# Patient Record
Sex: FEMALE | Race: WHITE | NOT HISPANIC OR LATINO | Employment: FULL TIME | URBAN - METROPOLITAN AREA
[De-identification: names, ages, dates, MRNs, and addresses within clinical notes are randomized per-mention and may not be internally consistent; named-entity substitution may affect disease eponyms.]

---

## 2017-08-28 LAB — CRC RECOMMENDATION EXT: NORMAL

## 2018-04-30 ENCOUNTER — TELEPHONE (OUTPATIENT)
Dept: SURGERY | Facility: CLINIC | Age: 53
End: 2018-04-30

## 2018-04-30 NOTE — TELEPHONE ENCOUNTER
Called patient regarding referral we received from Nuvia Escalante NP at [facility]. Left voicemail with my callback number requesting return call at earliest convenience

## 2018-04-30 NOTE — TELEPHONE ENCOUNTER
Patient returned my call, we discussed referral from Nuvia Escalante NP. Explained our abnormal mammogram protocol to patient, she would like her images reviewed. She requests that I e-mail her a JOSUE and she will sign it and get it back to me. Emailed JOSUE to [email or web address] and will request images as soon as signed copy is received. Encouraged her to call if she needs anything or wants to check on the status. Verbalized understanding of all information

## 2018-05-01 ENCOUNTER — TELEPHONE (OUTPATIENT)
Dept: SURGERY | Facility: CLINIC | Age: 53
End: 2018-05-01

## 2018-05-01 NOTE — TELEPHONE ENCOUNTER
Called patient to let her know we received her images from [facility]. Reviewed timeline for review, patient verbalized understanding

## 2018-05-03 ENCOUNTER — HOSPITAL ENCOUNTER (OUTPATIENT)
Dept: RADIOLOGY | Facility: HOSPITAL | Age: 53
Discharge: HOME | End: 2018-05-03
Attending: NURSE PRACTITIONER

## 2018-05-04 ENCOUNTER — TELEPHONE (OUTPATIENT)
Dept: RADIOLOGY | Facility: HOSPITAL | Age: 53
End: 2018-05-04

## 2018-05-04 NOTE — TELEPHONE ENCOUNTER
Spoke with patient reviewed MRI results. Reviewed MRI breast biopsy procedure and reviewed instructions for MRI breast biopsy. Patient expressed understanding and all questions were answered. Provided patient with my phone number to call for any further concerns or questions.   Patient scheduled consult with Dr Meraz 5/16/18 and MRI breast biopsy 5/17/18.

## 2018-05-16 ENCOUNTER — OFFICE VISIT (OUTPATIENT)
Dept: SURGERY | Facility: CLINIC | Age: 53
End: 2018-05-16
Payer: COMMERCIAL

## 2018-05-16 VITALS — HEART RATE: 77 BPM | SYSTOLIC BLOOD PRESSURE: 125 MMHG | TEMPERATURE: 98 F | DIASTOLIC BLOOD PRESSURE: 87 MMHG

## 2018-05-16 DIAGNOSIS — N64.53 NIPPLE RETRACTION: Primary | ICD-10-CM

## 2018-05-16 PROCEDURE — 99999 PR PBB SHADOW E&M-EST. PATIENT-LVL III: CPT | Mod: PBBFAC,,, | Performed by: SURGERY

## 2018-05-16 PROCEDURE — 99204 OFFICE O/P NEW MOD 45 MIN: CPT | Mod: S$GLB,,, | Performed by: SURGERY

## 2018-05-16 RX ORDER — ATENOLOL AND CHLORTHALIDONE TABLET 50; 25 MG/1; MG/1
1 TABLET ORAL EVERY MORNING
Refills: 3 | COMMUNITY
Start: 2018-03-08 | End: 2022-06-23 | Stop reason: SDUPTHER

## 2018-05-16 NOTE — LETTER
[facility]  [address]  [facility] [address]  Phone: [phone]  Fax: [phone] May 28, 2018      Nuvia Escalante, LESLIE-C  [address] [facility] [facility] [address]    Patient: Marquita Roe   MR Number: [id number]   YOB: 1965   Date of Visit: 5/16/2018     Dear Ms. Escalante:    Thank you for referring Marquita Roe to me for evaluation. Below are the relevant portions of my assessment and plan of care.    Ms. Roe has a right breast abnormality on MRI, inversion of nipple.    She will  undergo MRI-guided biopsy tomorrow.  We discussed expectations for the procedure and the potential results we may be faced with.  We discussed that regardless of what the needle biopsy shows, we would consider a surgical excision due to her abnormal exam findings not seeming benign in nature. She understands and is in full agreement with this plan.  I will see her back after the biopsy results regardless of result.    45 minutes were spent on this encounter, 30 of which were face to face counseling and 15 minutes of chart review and coordination of care.    If you have questions, please do not hesitate to call me. I look forward to following Marquita along with you.    Sincerely,      Chayo Meraz MD  Section of Breast Surgery  [facility]  Department of Surgery  [facility]    AR/hcr

## 2018-05-16 NOTE — PROGRESS NOTES
Breast Surgery  [facility]  Department of Surgery      REFERRING PROVIDER: Nuvia Escalante, NP-C  [address] [facility] [place] [address]    Chief Complaint: Consult (CBE prior to MRI bx 18)      Subjective:      Patient ID: Marquita Roe is a 52 y.o. female who presents with palpable right breast mass associated with nipple discharge and nipple inversion. She states that the inversion and nipple discharge was first noticed in march.  Occurred once- serous in nature, non bloody, non purulent.     Mammogram and U/S on 18 did not reveal any abnormality. Follow-up MRI 18 showed near non mass enhancement in the central right breast extending towards the nipple with possible nipple retraction, measuring over 4 cm in long axis.  A breast biopsy has not yet been performed. Has had annual bilateral mammograms ([id number]) all BIRADS1-2.     Patient does routinely do self breast exams. . Patient denies a personal history of breast cancer.    GYN History:  Age of menarche was 15. Age of menopause was perimenopausal.  Last menstrual period was 8 years ago. Patient denies hormonal therapy. On Lybrel for 5 years, stopped 4 years ago. Patient is . Age of first live birth was 25. Patient did breast feed.    PMH: HTN, HLD, Hemorrhoids   PSH: Nasal surgery, ACL repair       Smoker: Never smoker, Social drinker.     Family history  Mother - lung cancer, diagnosed 69,  (smoker)  No breast cancer, ovarian cancer, pancreatic cancer.     Review of patient's allergies indicates:  No Known Allergies      No current outpatient prescriptions on file prior to visit.     No current facility-administered medications on file prior to visit.      Social History     Social History    Marital status:      Spouse name: N/A    Number of children: N/A    Years of education: N/A     Occupational History    Not on file.     Social History Main Topics    Smoking status: Never  Smoker    Smokeless tobacco: Never Used    Alcohol use Not on file    Drug use: Unknown    Sexual activity: Not on file     Other Topics Concern    Not on file     Social History Narrative    No narrative on file     History reviewed. No pertinent family history.     Review of Systems   Constitutional: Negative for appetite change, chills, fever and unexpected weight change.   HENT: Negative for facial swelling, postnasal drip and sore throat.    Eyes: Negative for redness and itching.   Respiratory: Negative for chest tightness and shortness of breath.    Cardiovascular: Negative for chest pain and palpitations.   Gastrointestinal: Negative for blood in stool, diarrhea, nausea and vomiting.   Genitourinary: Negative for difficulty urinating and dysuria.   Musculoskeletal: Negative for arthralgias and joint swelling.   Skin: Negative for rash and wound.   Neurological: Negative for dizziness and syncope.   Hematological: Negative for adenopathy.   Psychiatric/Behavioral: Negative for agitation. The patient is not nervous/anxious.       + lower Back pain      Objective:   /87 (BP Location: Left arm, Patient Position: Sitting, BP Method: Medium (Automatic))   Pulse 77   Temp 98.3 °F (36.8 °C) (Oral)     Physical Exam   Constitutional: She appears well-developed and well-nourished.   HENT:   Head: Normocephalic.   Eyes: No scleral icterus.   Neck: Neck supple. No tracheal deviation present.   Cardiovascular: Normal rate and regular rhythm.    Pulmonary/Chest: Breath sounds normal. No respiratory distress. Right breast exhibits inverted nipple. Right breast exhibits no mass, no nipple discharge, no skin change and no tenderness. Left breast exhibits no inverted nipple, no mass, no nipple discharge, no skin change and no tenderness.   Right nipple inversion. Unable to express any nipple discharge. There is fullness to the breast.   Abdominal: Soft. She exhibits no mass. There is no tenderness.    Musculoskeletal: She exhibits no edema.   Lymphadenopathy:     She has no cervical adenopathy.   Neurological: She is alert.   Skin: No rash noted. No erythema.     Psychiatric: She has a normal mood and affect.          Radiology review: Images personally reviewed by me in the clinic.  OUTSIDE FILM REVIEW   Clinical information: Breast lump and clear nipple discharge. Outside images have been performed including breast MRI.   Mammogram (03/13/2018) there are scattered fibroglandular densities. Scattered benign-appearing calcifications are present bilaterally. There is no evidence of suspicious mass, architectural distortion, or clustered microcalcifications in either breast.  Breast ultrasound (03/13/2018) please note that due to the  depended nature of ultrasound, evaluation of outside images is limited. Images of the right breast including the retroareolar region and axilla were provided for review. No definite sonographic abnormalities are detected on the images provided.     Breast MRI (04/20/2018) Evaluation is limited by lack of subtracted images and kinetic evaluation. There is a suggestion of linear non mass enhancement in the central right breast extending towards the nipple with possible nipple retraction, measuring over 4 cm in long axis.   Based on available images, there appears to be suspicious non mass enhancement in the central right breast extending towards the nipple seen on MRI only without mammographic or sonographic correlate. MRI guided biopsy is recommended.   BI-RADS ASSESSMENT CATEGORY: 4 SUSPICIOUS   Assessment:       Right breast abnormality on MRI, inversion of nipple   Plan:       Will  Undergo MRI-guided biopsy tomorrow.  We discussed expectations for the procedure and the potential results we may be faced with.  We discussed that regardless of what the needle biopsy shows, we would consider a surgical excision due to her abnormal exam findings not seeming benign in nature.     She understands and is in full agreement with this plan.  I will see her back after the biopsy results regardless of result.      45 minutes were spent on this encounter, 30 of which were face to face counseling and 15 minutes of chart review and coordination of care.

## 2018-05-16 NOTE — LETTER
May 27, 2018      Nuvia Escalante, NP-C  [address]  [facility] [place] [address]           [facility]  [address]  [facility] [address]  Phone: [phone]  Fax: [phone]          Patient: Marquita Roe   MR Number: [id number]   YOB: 1965   Date of Visit: 5/16/2018       Dear Nuvia Escalante:    Thank you for referring Marquita Roe to me for evaluation. Attached you will find relevant portions of my assessment and plan of care.    If you have questions, please do not hesitate to call me. I look forward to following Marquita Roe along with you.    Sincerely,    Chayo Meraz MD    Enclosure  CC:  No Recipients    If you would like to receive this communication electronically, please contact [email or web address] or [phone] to request more information on [employer] access.    For providers and/or their staff who would like to refer a patient to [facility], please contact us through our one-stop-shop provider referral line, [facility], at [phone].    If you feel you have received this communication in error or would no longer like to receive these types of communications, please e-mail [email or web address]

## 2018-05-17 ENCOUNTER — HOSPITAL ENCOUNTER (OUTPATIENT)
Dept: RADIOLOGY | Facility: HOSPITAL | Age: 53
Discharge: HOME | End: 2018-05-17
Attending: SURGERY
Payer: COMMERCIAL

## 2018-05-17 DIAGNOSIS — R93.89 ABNORMAL MRI: Primary | ICD-10-CM

## 2018-05-17 DIAGNOSIS — R93.89 ABNORMAL MRI: ICD-10-CM

## 2018-05-17 PROCEDURE — 88342 IMHCHEM/IMCYTCHM 1ST ANTB: CPT | Performed by: PATHOLOGY

## 2018-05-17 PROCEDURE — 77065 DX MAMMO INCL CAD UNI: CPT | Mod: TC,RT

## 2018-05-17 PROCEDURE — 88305 TISSUE EXAM BY PATHOLOGIST: CPT | Mod: 26,,, | Performed by: PATHOLOGY

## 2018-05-17 PROCEDURE — 88342 IMHCHEM/IMCYTCHM 1ST ANTB: CPT | Mod: 26,,, | Performed by: PATHOLOGY

## 2018-05-17 PROCEDURE — 19085 BX BREAST 1ST LESION MR IMAG: CPT | Mod: TC

## 2018-05-17 PROCEDURE — 77065 DX MAMMO INCL CAD UNI: CPT | Mod: 26,RT,, | Performed by: RADIOLOGY

## 2018-05-17 PROCEDURE — 25500020 PHARM REV CODE 255: Performed by: SURGERY

## 2018-05-17 PROCEDURE — A9577 INJ MULTIHANCE: HCPCS | Performed by: SURGERY

## 2018-05-17 PROCEDURE — 19085 BX BREAST 1ST LESION MR IMAG: CPT | Mod: RT,,, | Performed by: RADIOLOGY

## 2018-05-17 PROCEDURE — 88305 TISSUE EXAM BY PATHOLOGIST: CPT | Performed by: PATHOLOGY

## 2018-05-17 RX ADMIN — GADOBENATE DIMEGLUMINE 15 ML: 529 INJECTION, SOLUTION INTRAVENOUS at 08:05

## 2018-05-23 ENCOUNTER — PATIENT MESSAGE (OUTPATIENT)
Dept: SURGERY | Facility: CLINIC | Age: 53
End: 2018-05-23

## 2018-05-24 ENCOUNTER — TELEPHONE (OUTPATIENT)
Dept: SURGERY | Facility: CLINIC | Age: 53
End: 2018-05-24

## 2018-05-24 NOTE — TELEPHONE ENCOUNTER
----- Message from Chayo Meraz MD sent at 5/24/2018 11:49 AM CDT -----  Not sure if you have called her with path yet, but it is discordant.  I am pretty sure I already talked to her in the office about needing to take a surgical excisional biopsy if the needle biopsy came back benign, so if you could just set her up to return to clinic for more discussion on this when you call, that would be great.  Again, I don't think this plan will be unexpected to her.    Thanks  Chayo

## 2018-05-24 NOTE — TELEPHONE ENCOUNTER
Called patient to discuss biopsy results. Explained that biopsy showed chronic inflammation and fibrosis, however both Dr. Griffin and Dr. Meraz believe this should still be excised due to the affect it has had on her breast. Patient agrees with this, she and Dr. Meraz had already discussed it. Scheduled her to come back in and talk to Dr. Meraz on 6/5 per her request, so they can tentatively schedule the procedure while she is on summer vacation. Patient verbalized understanding to appt information and plan of care, encouraged her to call with any questions/concerns.

## 2018-06-05 ENCOUNTER — OFFICE VISIT (OUTPATIENT)
Dept: SURGERY | Facility: CLINIC | Age: 53
End: 2018-06-05
Payer: COMMERCIAL

## 2018-06-05 VITALS — SYSTOLIC BLOOD PRESSURE: 129 MMHG | HEART RATE: 71 BPM | DIASTOLIC BLOOD PRESSURE: 86 MMHG

## 2018-06-05 DIAGNOSIS — N64.53 NIPPLE RETRACTION: Primary | ICD-10-CM

## 2018-06-05 PROCEDURE — 99999 PR PBB SHADOW E&M-EST. PATIENT-LVL II: CPT | Mod: PBBFAC,,, | Performed by: SURGERY

## 2018-06-05 PROCEDURE — 99215 OFFICE O/P EST HI 40 MIN: CPT | Mod: S$GLB,,, | Performed by: SURGERY

## 2018-06-05 RX ORDER — NAPROXEN SODIUM 220 MG/1
81 TABLET, FILM COATED ORAL EVERY MORNING
COMMUNITY
End: 2022-02-02 | Stop reason: SDUPTHER

## 2018-06-05 NOTE — PROGRESS NOTES
Breast Surgery  [facility]  Department of Surgery      REFERRING PROVIDER: No referring provider defined for this encounter.    Chief Complaint: Nipple inversion, abnormal MRI    Subjective:      Patient ID: Marquita Roe is a 52 y.o. female who presents with right breast mass associated with nipple discharge and nipple inversion. She states that the inversion and nipple discharge was first noticed in march.  Occurred once- serous in nature, non bloody, non purulent.     Mammogram and U/S on 18 did not reveal any abnormality. Follow-up MRI 18 showed near non mass enhancement in the central right breast extending towards the nipple with possible nipple retraction, measuring over 4 cm in long axis.  A breast biopsy has was negative which is discordant Has had annual bilateral mammograms ([id number]) all BIRADS1-2.     Patient does routinely do self breast exams. . Patient denies a personal history of breast cancer.    She is here for f/u after non-diagnostic results.  No changes in hx    GYN History:  Age of menarche was 15. Age of menopause was perimenopausal.  Last menstrual period was 8 years ago. Patient denies hormonal therapy. On Lybrel for 5 years, stopped 4 years ago. Patient is . Age of first live birth was 25. Patient did breast feed.    PMH: HTN, HLD, Hemorrhoids   PSH: Nasal surgery, ACL repair       Smoker: Never smoker, Social drinker.     Family history  Mother - lung cancer, diagnosed 69,  (smoker)  No breast cancer, ovarian cancer, pancreatic cancer.     Review of patient's allergies indicates:  No Known Allergies      Current Outpatient Prescriptions on File Prior to Visit   Medication Sig Dispense Refill    atenolol-chlorthalidone (TENORETIC) 50-25 mg Tab Take 1 tablet by mouth every morning.  3     No current facility-administered medications on file prior to visit.      Social History     Social History    Marital status:      Spouse name: N/A    Number  of children: N/A    Years of education: N/A     Occupational History    Not on file.     Social History Main Topics    Smoking status: Never Smoker    Smokeless tobacco: Never Used    Alcohol use Not on file    Drug use: Unknown    Sexual activity: Not on file     Other Topics Concern    Not on file     Social History Narrative    No narrative on file     Family History   Problem Relation Age of Onset    Cancer Mother 69        Lung - Smoker        Review of Systems   Constitutional: Negative for appetite change, chills, fever and unexpected weight change.   HENT: Negative for facial swelling, postnasal drip and sore throat.    Eyes: Negative for redness and itching.   Respiratory: Negative for chest tightness and shortness of breath.    Cardiovascular: Negative for chest pain and palpitations.   Gastrointestinal: Negative for blood in stool, diarrhea, nausea and vomiting.   Genitourinary: Negative for difficulty urinating and dysuria.   Musculoskeletal: Negative for arthralgias and joint swelling.   Skin: Negative for rash and wound.   Neurological: Negative for dizziness and syncope.   Hematological: Negative for adenopathy.   Psychiatric/Behavioral: Negative for agitation. The patient is not nervous/anxious.       + lower Back pain      Objective:   /86 (BP Location: Left arm, Patient Position: Sitting, BP Method: Medium (Automatic))   Pulse 71     Physical Exam   Constitutional: She appears well-developed and well-nourished.   HENT:   Head: Normocephalic.   Eyes: No scleral icterus.   Neck: Neck supple. No tracheal deviation present.   Cardiovascular: Normal rate and regular rhythm.    Pulmonary/Chest: Breath sounds normal. No respiratory distress. Right breast exhibits inverted nipple. Right breast exhibits no mass, no nipple discharge, no skin change and no tenderness. Left breast exhibits no inverted nipple, no mass, no nipple discharge, no skin change and no tenderness.   Right nipple  inversion. Unable to express any nipple discharge. There is fullness to the breast.   Abdominal: Soft. She exhibits no mass. There is no tenderness.   Musculoskeletal: She exhibits no edema.   Lymphadenopathy:     She has no cervical adenopathy.   Neurological: She is alert.   Skin: No rash noted. No erythema.     Psychiatric: She has a normal mood and affect.          Radiology review: Images personally reviewed by me in the clinic.  OUTSIDE FILM REVIEW   Clinical information: Breast lump and clear nipple discharge. Outside images have been performed including breast MRI.   Mammogram (03/13/2018) there are scattered fibroglandular densities. Scattered benign-appearing calcifications are present bilaterally. There is no evidence of suspicious mass, architectural distortion, or clustered microcalcifications in either breast.  Breast ultrasound (03/13/2018) please note that due to the  depended nature of ultrasound, evaluation of outside images is limited. Images of the right breast including the retroareolar region and axilla were provided for review. No definite sonographic abnormalities are detected on the images provided.     Breast MRI (04/20/2018) Evaluation is limited by lack of subtracted images and kinetic evaluation. There is a suggestion of linear non mass enhancement in the central right breast extending towards the nipple with possible nipple retraction, measuring over 4 cm in long axis.   Based on available images, there appears to be suspicious non mass enhancement in the central right breast extending towards the nipple seen on MRI only without mammographic or sonographic correlate. MRI guided biopsy is recommended.   BI-RADS ASSESSMENT CATEGORY: 4 SUSPICIOUS       PATH:  FINAL PATHOLOGIC DIAGNOSIS  RIGHT BREAST, NON-MASS ENHANCEMENT, BIOPSY:  - Benign breast tissue with duct ectasia surrounded by chronic inflammation and fibrosis.  - Negative for atypia or malignancy.  Note: Pancytokeratin  with appropriate controls is used for evaluation of multiple foci and supports the diagnosis that these foci are indeed inflammation and not epithelial cells.      Assessment:       Right breast abnormality on MRI, inversion of nipple, negative breast biopsy.  US in the office today did not reveal any abnormality, nor could the clip be visualized  Plan:       Given negative clinic US, pt will need MMG guided mag seed placement at the clip so that an excisional biopsy can be performed  MRI imaging results are not concordant with benign biopsy results  Currently pt is dealing with a Stroke in the family in her sister with a poor prognosis and has been spending a lot of time in the hospital  Will schedule MG guided seed placement at her earliest convenience with plan to proceed to Mag seed localized excisional biopsy  Consents obtained in the office today  We discussed that this entire area may not be widely excised however, we will provide the pathologist with enough tissue to be more certain that there is no malignancy in the area.  Will attempt duct excision, but I have not been able to elicit nipple discharge in clinic so not certain that we will be able to do so.    60 minutes were spent on this encounter, 45 of which were face to face and included counseling, 15 on coordination of care and chart review

## 2018-06-06 ENCOUNTER — TELEPHONE (OUTPATIENT)
Dept: SURGERY | Facility: CLINIC | Age: 53
End: 2018-06-06

## 2018-06-06 NOTE — TELEPHONE ENCOUNTER
Spoke with pt regarding the magseed localization time on Wednesday 6/13/18. Pt scheduled at 9:30 am and to report to the [facility] [place]. Pt voiced understanding and will call us with an exact surgery date in the next week or so, as pt caring for her sister who is in the hospital.

## 2018-06-11 DIAGNOSIS — R92.8 ABNORMAL MAMMOGRAM OF RIGHT BREAST: Primary | ICD-10-CM

## 2018-06-13 ENCOUNTER — HOSPITAL ENCOUNTER (OUTPATIENT)
Dept: RADIOLOGY | Facility: HOSPITAL | Age: 53
Discharge: HOME | End: 2018-06-13
Attending: SURGERY
Payer: COMMERCIAL

## 2018-06-13 DIAGNOSIS — N63.0 BREAST MASS: ICD-10-CM

## 2018-06-13 DIAGNOSIS — N64.52 NIPPLE DISCHARGE: ICD-10-CM

## 2018-06-13 PROCEDURE — A4648 IMPLANTABLE TISSUE MARKER: HCPCS | Mod: PO

## 2018-06-13 PROCEDURE — 19281 PERQ DEVICE BREAST 1ST IMAG: CPT | Mod: RT,,, | Performed by: RADIOLOGY

## 2018-06-19 ENCOUNTER — ANESTHESIA EVENT (OUTPATIENT)
Dept: SURGERY | Facility: HOSPITAL | Age: 53
End: 2018-06-19
Payer: COMMERCIAL

## 2018-06-20 ENCOUNTER — TELEPHONE (OUTPATIENT)
Dept: SURGERY | Facility: CLINIC | Age: 53
End: 2018-06-20

## 2018-06-21 ENCOUNTER — ANESTHESIA (OUTPATIENT)
Dept: SURGERY | Facility: HOSPITAL | Age: 53
End: 2018-06-21
Payer: COMMERCIAL

## 2018-06-21 ENCOUNTER — HOSPITAL ENCOUNTER (OUTPATIENT)
Dept: RADIOLOGY | Facility: HOSPITAL | Age: 53
Discharge: HOME | End: 2018-06-21
Attending: SURGERY | Admitting: SURGERY
Payer: COMMERCIAL

## 2018-06-21 ENCOUNTER — HOSPITAL ENCOUNTER (OUTPATIENT)
Facility: HOSPITAL | Age: 53
Discharge: HOME | End: 2018-06-21
Attending: SURGERY | Admitting: SURGERY
Payer: COMMERCIAL

## 2018-06-21 VITALS
RESPIRATION RATE: 18 BRPM | HEIGHT: 66 IN | OXYGEN SATURATION: 99 % | BODY MASS INDEX: 24.11 KG/M2 | DIASTOLIC BLOOD PRESSURE: 86 MMHG | TEMPERATURE: 98 F | HEART RATE: 84 BPM | WEIGHT: 150 LBS | SYSTOLIC BLOOD PRESSURE: 122 MMHG

## 2018-06-21 DIAGNOSIS — N63.10 BREAST MASS, RIGHT: Primary | ICD-10-CM

## 2018-06-21 DIAGNOSIS — N63.0 BREAST MASS: ICD-10-CM

## 2018-06-21 DIAGNOSIS — N64.52 NIPPLE DISCHARGE: ICD-10-CM

## 2018-06-21 PROCEDURE — 63600175 PHARM REV CODE 636 W HCPCS: Mod: JG | Performed by: SURGERY

## 2018-06-21 PROCEDURE — 36000707: Performed by: SURGERY

## 2018-06-21 PROCEDURE — 71000016 HC POSTOP RECOV ADDL HR: Performed by: SURGERY

## 2018-06-21 PROCEDURE — 88307 TISSUE EXAM BY PATHOLOGIST: CPT | Performed by: PATHOLOGY

## 2018-06-21 PROCEDURE — 76098 X-RAY EXAM SURGICAL SPECIMEN: CPT | Mod: TC,PO

## 2018-06-21 PROCEDURE — 63600175 PHARM REV CODE 636 W HCPCS: Performed by: NURSE ANESTHETIST, CERTIFIED REGISTERED

## 2018-06-21 PROCEDURE — 36000706: Performed by: SURGERY

## 2018-06-21 PROCEDURE — 63600175 PHARM REV CODE 636 W HCPCS: Performed by: STUDENT IN AN ORGANIZED HEALTH CARE EDUCATION/TRAINING PROGRAM

## 2018-06-21 PROCEDURE — S0020 INJECTION, BUPIVICAINE HYDRO: HCPCS | Performed by: SURGERY

## 2018-06-21 PROCEDURE — 88307 TISSUE EXAM BY PATHOLOGIST: CPT | Mod: 26,,, | Performed by: PATHOLOGY

## 2018-06-21 PROCEDURE — 37000008 HC ANESTHESIA 1ST 15 MINUTES: Performed by: SURGERY

## 2018-06-21 PROCEDURE — 25000003 PHARM REV CODE 250: Performed by: STUDENT IN AN ORGANIZED HEALTH CARE EDUCATION/TRAINING PROGRAM

## 2018-06-21 PROCEDURE — 25000003 PHARM REV CODE 250: Performed by: SURGERY

## 2018-06-21 PROCEDURE — D9220A PRA ANESTHESIA: Mod: ,,, | Performed by: ANESTHESIOLOGY

## 2018-06-21 PROCEDURE — 71000015 HC POSTOP RECOV 1ST HR: Performed by: SURGERY

## 2018-06-21 PROCEDURE — 37000009 HC ANESTHESIA EA ADD 15 MINS: Performed by: SURGERY

## 2018-06-21 PROCEDURE — 76098 X-RAY EXAM SURGICAL SPECIMEN: CPT | Mod: 26,,, | Performed by: RADIOLOGY

## 2018-06-21 PROCEDURE — 19125 EXCISION BREAST LESION: CPT | Mod: RT,,, | Performed by: SURGERY

## 2018-06-21 PROCEDURE — 25000003 PHARM REV CODE 250: Performed by: NURSE ANESTHETIST, CERTIFIED REGISTERED

## 2018-06-21 PROCEDURE — 88305 TISSUE EXAM BY PATHOLOGIST: CPT | Performed by: PATHOLOGY

## 2018-06-21 PROCEDURE — 88305 TISSUE EXAM BY PATHOLOGIST: CPT | Mod: 26,,, | Performed by: PATHOLOGY

## 2018-06-21 PROCEDURE — 71000044 HC DOSC ROUTINE RECOVERY FIRST HOUR: Performed by: SURGERY

## 2018-06-21 RX ORDER — BUPIVACAINE HYDROCHLORIDE 5 MG/ML
INJECTION, SOLUTION EPIDURAL; INTRACAUDAL
Status: DISCONTINUED | OUTPATIENT
Start: 2018-06-21 | End: 2018-06-21 | Stop reason: HOSPADM

## 2018-06-21 RX ORDER — ONDANSETRON 2 MG/ML
INJECTION INTRAMUSCULAR; INTRAVENOUS
Status: DISCONTINUED | OUTPATIENT
Start: 2018-06-21 | End: 2018-06-21

## 2018-06-21 RX ORDER — PROPOFOL 10 MG/ML
VIAL (ML) INTRAVENOUS
Status: DISCONTINUED | OUTPATIENT
Start: 2018-06-21 | End: 2018-06-21

## 2018-06-21 RX ORDER — FENTANYL CITRATE 50 UG/ML
25 INJECTION, SOLUTION INTRAMUSCULAR; INTRAVENOUS EVERY 5 MIN PRN
Status: DISCONTINUED | OUTPATIENT
Start: 2018-06-21 | End: 2018-06-21 | Stop reason: HOSPADM

## 2018-06-21 RX ORDER — SODIUM CHLORIDE 0.9 % (FLUSH) 0.9 %
3 SYRINGE (ML) INJECTION
Status: DISCONTINUED | OUTPATIENT
Start: 2018-06-21 | End: 2018-06-21 | Stop reason: HOSPADM

## 2018-06-21 RX ORDER — MIDAZOLAM HYDROCHLORIDE 1 MG/ML
INJECTION, SOLUTION INTRAMUSCULAR; INTRAVENOUS
Status: DISCONTINUED | OUTPATIENT
Start: 2018-06-21 | End: 2018-06-21

## 2018-06-21 RX ORDER — GLYCOPYRROLATE 0.2 MG/ML
INJECTION INTRAMUSCULAR; INTRAVENOUS
Status: DISCONTINUED | OUTPATIENT
Start: 2018-06-21 | End: 2018-06-21

## 2018-06-21 RX ORDER — LIDOCAINE HCL/PF 100 MG/5ML
SYRINGE (ML) INTRAVENOUS
Status: DISCONTINUED | OUTPATIENT
Start: 2018-06-21 | End: 2018-06-21

## 2018-06-21 RX ORDER — FENTANYL CITRATE 50 UG/ML
INJECTION, SOLUTION INTRAMUSCULAR; INTRAVENOUS
Status: DISCONTINUED | OUTPATIENT
Start: 2018-06-21 | End: 2018-06-21

## 2018-06-21 RX ORDER — METHYLENE BLUE 5 MG/ML
INJECTION INTRAVENOUS
Status: DISCONTINUED | OUTPATIENT
Start: 2018-06-21 | End: 2018-06-21 | Stop reason: HOSPADM

## 2018-06-21 RX ORDER — HYDROCODONE BITARTRATE AND ACETAMINOPHEN 5; 325 MG/1; MG/1
1 TABLET ORAL EVERY 4 HOURS PRN
Qty: 31 TABLET | Refills: 0 | Status: SHIPPED | OUTPATIENT
Start: 2018-06-21 | End: 2018-07-11

## 2018-06-21 RX ORDER — DEXAMETHASONE SODIUM PHOSPHATE 4 MG/ML
INJECTION, SOLUTION INTRA-ARTICULAR; INTRALESIONAL; INTRAMUSCULAR; INTRAVENOUS; SOFT TISSUE
Status: DISCONTINUED | OUTPATIENT
Start: 2018-06-21 | End: 2018-06-21

## 2018-06-21 RX ORDER — KETAMINE HCL IN 0.9 % NACL 50 MG/5 ML
SYRINGE (ML) INTRAVENOUS
Status: DISCONTINUED | OUTPATIENT
Start: 2018-06-21 | End: 2018-06-21

## 2018-06-21 RX ORDER — HYDROCODONE BITARTRATE AND ACETAMINOPHEN 5; 325 MG/1; MG/1
1 TABLET ORAL ONCE AS NEEDED
Status: DISCONTINUED | OUTPATIENT
Start: 2018-06-21 | End: 2018-06-21 | Stop reason: HOSPADM

## 2018-06-21 RX ORDER — SODIUM CHLORIDE 9 MG/ML
INJECTION, SOLUTION INTRAVENOUS CONTINUOUS
Status: DISCONTINUED | OUTPATIENT
Start: 2018-06-21 | End: 2018-06-21 | Stop reason: HOSPADM

## 2018-06-21 RX ORDER — ACETAMINOPHEN 10 MG/ML
INJECTION, SOLUTION INTRAVENOUS
Status: DISCONTINUED | OUTPATIENT
Start: 2018-06-21 | End: 2018-06-21

## 2018-06-21 RX ORDER — PHENYLEPHRINE HYDROCHLORIDE 10 MG/ML
INJECTION INTRAVENOUS
Status: DISCONTINUED | OUTPATIENT
Start: 2018-06-21 | End: 2018-06-21

## 2018-06-21 RX ORDER — CEFAZOLIN SODIUM 1 G/3ML
2 INJECTION, POWDER, FOR SOLUTION INTRAMUSCULAR; INTRAVENOUS
Status: COMPLETED | OUTPATIENT
Start: 2018-06-21 | End: 2018-06-21

## 2018-06-21 RX ADMIN — FENTANYL CITRATE 25 MCG: 50 INJECTION, SOLUTION INTRAMUSCULAR; INTRAVENOUS at 05:06

## 2018-06-21 RX ADMIN — PHENYLEPHRINE HYDROCHLORIDE 100 MCG: 10 INJECTION INTRAVENOUS at 05:06

## 2018-06-21 RX ADMIN — SODIUM CHLORIDE, SODIUM GLUCONATE, SODIUM ACETATE, POTASSIUM CHLORIDE, MAGNESIUM CHLORIDE, SODIUM PHOSPHATE, DIBASIC, AND POTASSIUM PHOSPHATE: .53; .5; .37; .037; .03; .012; .00082 INJECTION, SOLUTION INTRAVENOUS at 05:06

## 2018-06-21 RX ADMIN — LIDOCAINE HYDROCHLORIDE 100 MG: 20 INJECTION, SOLUTION INTRAVENOUS at 04:06

## 2018-06-21 RX ADMIN — FENTANYL CITRATE 100 MCG: 50 INJECTION, SOLUTION INTRAMUSCULAR; INTRAVENOUS at 04:06

## 2018-06-21 RX ADMIN — ONDANSETRON 4 MG: 2 INJECTION INTRAMUSCULAR; INTRAVENOUS at 06:06

## 2018-06-21 RX ADMIN — DEXAMETHASONE SODIUM PHOSPHATE 8 MG: 4 INJECTION, SOLUTION INTRAMUSCULAR; INTRAVENOUS at 04:06

## 2018-06-21 RX ADMIN — SODIUM CHLORIDE: 0.9 INJECTION, SOLUTION INTRAVENOUS at 04:06

## 2018-06-21 RX ADMIN — ACETAMINOPHEN 1000 MG: 10 INJECTION, SOLUTION INTRAVENOUS at 04:06

## 2018-06-21 RX ADMIN — GLYCOPYRROLATE 0.2 MG: 0.2 INJECTION, SOLUTION INTRAMUSCULAR; INTRAVENOUS at 04:06

## 2018-06-21 RX ADMIN — FENTANYL CITRATE 25 MCG: 50 INJECTION, SOLUTION INTRAMUSCULAR; INTRAVENOUS at 06:06

## 2018-06-21 RX ADMIN — PROPOFOL 150 MG: 10 INJECTION, EMULSION INTRAVENOUS at 04:06

## 2018-06-21 RX ADMIN — Medication 20 MG: at 04:06

## 2018-06-21 RX ADMIN — MIDAZOLAM HYDROCHLORIDE 2 MG: 1 INJECTION, SOLUTION INTRAMUSCULAR; INTRAVENOUS at 04:06

## 2018-06-21 RX ADMIN — CEFAZOLIN 2 G: 330 INJECTION, POWDER, FOR SOLUTION INTRAMUSCULAR; INTRAVENOUS at 04:06

## 2018-06-21 RX ADMIN — Medication 10 MG: at 05:06

## 2018-06-21 NOTE — TRANSFER OF CARE
"Anesthesia Transfer of Care Note    Patient: Marquita Roe    Procedure(s) Performed: Procedure(s) (LRB):  EXCISIONAL BIOPSY-breast  w/SEED LOCALIZATION (Right)  EXCISION-DUCT Duct End Breast (Right)    Patient location: [facility]    Anesthesia Type: general    Transport from OR: Transported from OR on 6-10 L/min O2 by face mask with adequate spontaneous ventilation    Post pain: adequate analgesia    Post assessment: tolerated procedure well and no apparent anesthetic complications    Level of consciousness: sedated and responds to stimulation    Nausea/Vomiting: no nausea/vomiting    Complications: none    Transfer of care protocol was followed      Last vitals:   Visit Vitals  /81 (BP Location: Right arm, Patient Position: Lying)   Pulse 63   Temp 36.8 °C (98.3 °F) (Oral)   Resp 18   Ht 5' 6" (1.676 m)   Wt 68 kg (150 lb)   SpO2 97%   Breastfeeding? No   BMI 24.21 kg/m²     "

## 2018-06-21 NOTE — BRIEF OP NOTE
[facility]  Brief Operative Note     SUMMARY     Surgery Date: 6/21/2018     Surgeon(s) and Role:     * Chayo Meraz MD - Primary     * Tien Mcleod MD - Resident - Assisting      * Nanette Lopez MD - Resident - Assisting    Pre-op Diagnosis:  Abnormal mammogram of right breast [R92.8]    Post-op Diagnosis:  Post-Op Diagnosis Codes:     * Abnormal mammogram of right breast [R92.8]    Procedure(s) (LRB):  EXCISIONAL BIOPSY-breast  w/SEED LOCALIZATION (Right)  EXCISION-DUCT Duct End Breast (Right)    Anesthesia: General    Description of the findings of the procedure: Right breast excisional biopsy with right duct excision.    Findings/Key Components: Right breast excisional biopsy via ultrasound guidance. Verification of clip and mag seed within specimen via intraoperative x-ray. Right breast duct excision.    Estimated Blood Loss: 20mL         Specimens:   Specimen (12h ago through future)    Start     Ordered    06/21/18 1758  Specimen to Pathology - Surgery  Once     Comments:  1.) Right Breast Excisional Biopsy with Magseed; Short Stitch Superior, Long Stitch Lateral-Permanent.2.) Right Breast End Duct Excision; Short Stitch Superior, Long Stitch Lateral; 1 Short Stitch and 1 Long Stitch on the End Duct-Permanent.      06/21/18 1800          Discharge Note    SUMMARY     Admit Date: 6/21/2018    Discharge Date and Time:  06/21/2018 6:33 PM    Hospital Course (synopsis of major diagnoses, care, treatment, and services provided during the course of the hospital stay): Marquita Roe underwent outpatient right breast excisional biopsy. Prior to discharge home on 06/21/2018 her pain was well controlled on oral medications and she tolerated a diet.. She was discharged home in good condition on POD#0.       Final Diagnosis: Post-Op Diagnosis Codes:     * Abnormal mammogram of right breast [R92.8]    Disposition: Home or Self Care    Follow Up/Patient Instructions:     Medications:  Reconciled  Home Medications:      Medication List      START taking these medications    HYDROcodone-acetaminophen 5-325 mg per tablet  Commonly known as:  NORCO  Take 1 tablet by mouth every 4 (four) hours as needed for Pain (Do not drive while taking pain medications).        CONTINUE taking these medications    aspirin 81 MG Chew  Take 81 mg by mouth every morning.     atenolol-chlorthalidone 50-25 mg Tab  Commonly known as:  TENORETIC  Take 1 tablet by mouth every morning.            Discharge Procedure Orders  Diet Adult Regular     Activity as tolerated     Shower on day dressing removed (No bath)   Order Comments: Shower after 48 hours after surgery, do not submerge incisions for 6 weeks.     No driving until:   Order Comments: Do not drive while taking pain medications.     Notify your health care provider if you experience any of the following:  temperature >100.4     Notify your health care provider if you experience any of the following:  persistent nausea and vomiting or diarrhea     Notify your health care provider if you experience any of the following:  severe uncontrolled pain     Notify your health care provider if you experience any of the following:  redness, tenderness, or signs of infection (pain, swelling, redness, odor or green/yellow discharge around incision site)     No dressing needed   Order Comments: Incision covered with dermabond (superglue), no dressing needed. Can keep covered with gauze as needed to protect clothing.       Follow-up Information     Chayo Meraz MD In 2 weeks.    Specialties:  General Surgery, Breast Surgery  Why:  Post-op  Contact information:  [address]  [facility] [address]  [phone]

## 2018-06-21 NOTE — H&P (VIEW-ONLY)
Breast Surgery  [facility]  Department of Surgery      REFERRING PROVIDER: No referring provider defined for this encounter.    Chief Complaint: Nipple inversion, abnormal MRI    Subjective:      Patient ID: Marquita Roe is a 52 y.o. female who presents with right breast mass associated with nipple discharge and nipple inversion. She states that the inversion and nipple discharge was first noticed in march.  Occurred once- serous in nature, non bloody, non purulent.     Mammogram and U/S on 18 did not reveal any abnormality. Follow-up MRI 18 showed near non mass enhancement in the central right breast extending towards the nipple with possible nipple retraction, measuring over 4 cm in long axis.  A breast biopsy has was negative which is discordant Has had annual bilateral mammograms ([id number]) all BIRADS1-2.     Patient does routinely do self breast exams. . Patient denies a personal history of breast cancer.    She is here for f/u after non-diagnostic results.  No changes in hx    GYN History:  Age of menarche was 15. Age of menopause was perimenopausal.  Last menstrual period was 8 years ago. Patient denies hormonal therapy. On Lybrel for 5 years, stopped 4 years ago. Patient is . Age of first live birth was 25. Patient did breast feed.    PMH: HTN, HLD, Hemorrhoids   PSH: Nasal surgery, ACL repair       Smoker: Never smoker, Social drinker.     Family history  Mother - lung cancer, diagnosed 69,  (smoker)  No breast cancer, ovarian cancer, pancreatic cancer.     Review of patient's allergies indicates:  No Known Allergies      Current Outpatient Prescriptions on File Prior to Visit   Medication Sig Dispense Refill    atenolol-chlorthalidone (TENORETIC) 50-25 mg Tab Take 1 tablet by mouth every morning.  3     No current facility-administered medications on file prior to visit.      Social History     Social History    Marital status:      Spouse name: N/A    Number  of children: N/A    Years of education: N/A     Occupational History    Not on file.     Social History Main Topics    Smoking status: Never Smoker    Smokeless tobacco: Never Used    Alcohol use Not on file    Drug use: Unknown    Sexual activity: Not on file     Other Topics Concern    Not on file     Social History Narrative    No narrative on file     Family History   Problem Relation Age of Onset    Cancer Mother 69        Lung - Smoker        Review of Systems   Constitutional: Negative for appetite change, chills, fever and unexpected weight change.   HENT: Negative for facial swelling, postnasal drip and sore throat.    Eyes: Negative for redness and itching.   Respiratory: Negative for chest tightness and shortness of breath.    Cardiovascular: Negative for chest pain and palpitations.   Gastrointestinal: Negative for blood in stool, diarrhea, nausea and vomiting.   Genitourinary: Negative for difficulty urinating and dysuria.   Musculoskeletal: Negative for arthralgias and joint swelling.   Skin: Negative for rash and wound.   Neurological: Negative for dizziness and syncope.   Hematological: Negative for adenopathy.   Psychiatric/Behavioral: Negative for agitation. The patient is not nervous/anxious.       + lower Back pain      Objective:   /86 (BP Location: Left arm, Patient Position: Sitting, BP Method: Medium (Automatic))   Pulse 71     Physical Exam   Constitutional: She appears well-developed and well-nourished.   HENT:   Head: Normocephalic.   Eyes: No scleral icterus.   Neck: Neck supple. No tracheal deviation present.   Cardiovascular: Normal rate and regular rhythm.    Pulmonary/Chest: Breath sounds normal. No respiratory distress. Right breast exhibits inverted nipple. Right breast exhibits no mass, no nipple discharge, no skin change and no tenderness. Left breast exhibits no inverted nipple, no mass, no nipple discharge, no skin change and no tenderness.   Right nipple  inversion. Unable to express any nipple discharge. There is fullness to the breast.   Abdominal: Soft. She exhibits no mass. There is no tenderness.   Musculoskeletal: She exhibits no edema.   Lymphadenopathy:     She has no cervical adenopathy.   Neurological: She is alert.   Skin: No rash noted. No erythema.     Psychiatric: She has a normal mood and affect.          Radiology review: Images personally reviewed by me in the clinic.  OUTSIDE FILM REVIEW   Clinical information: Breast lump and clear nipple discharge. Outside images have been performed including breast MRI.   Mammogram (03/13/2018) there are scattered fibroglandular densities. Scattered benign-appearing calcifications are present bilaterally. There is no evidence of suspicious mass, architectural distortion, or clustered microcalcifications in either breast.  Breast ultrasound (03/13/2018) please note that due to the  depended nature of ultrasound, evaluation of outside images is limited. Images of the right breast including the retroareolar region and axilla were provided for review. No definite sonographic abnormalities are detected on the images provided.     Breast MRI (04/20/2018) Evaluation is limited by lack of subtracted images and kinetic evaluation. There is a suggestion of linear non mass enhancement in the central right breast extending towards the nipple with possible nipple retraction, measuring over 4 cm in long axis.   Based on available images, there appears to be suspicious non mass enhancement in the central right breast extending towards the nipple seen on MRI only without mammographic or sonographic correlate. MRI guided biopsy is recommended.   BI-RADS ASSESSMENT CATEGORY: 4 SUSPICIOUS       PATH:  FINAL PATHOLOGIC DIAGNOSIS  RIGHT BREAST, NON-MASS ENHANCEMENT, BIOPSY:  - Benign breast tissue with duct ectasia surrounded by chronic inflammation and fibrosis.  - Negative for atypia or malignancy.  Note: Pancytokeratin  with appropriate controls is used for evaluation of multiple foci and supports the diagnosis that these foci are indeed inflammation and not epithelial cells.      Assessment:       Right breast abnormality on MRI, inversion of nipple, negative breast biopsy.  US in the office today did not reveal any abnormality, nor could the clip be visualized  Plan:       Given negative clinic US, pt will need MMG guided mag seed placement at the clip so that an excisional biopsy can be performed  MRI imaging results are not concordant with benign biopsy results  Currently pt is dealing with a Stroke in the family in her sister with a poor prognosis and has been spending a lot of time in the hospital  Will schedule MG guided seed placement at her earliest convenience with plan to proceed to Mag seed localized excisional biopsy  Consents obtained in the office today  We discussed that this entire area may not be widely excised however, we will provide the pathologist with enough tissue to be more certain that there is no malignancy in the area.  Will attempt duct excision, but I have not been able to elicit nipple discharge in clinic so not certain that we will be able to do so.    60 minutes were spent on this encounter, 45 of which were face to face and included counseling, 15 on coordination of care and chart review

## 2018-06-21 NOTE — ANESTHESIA PREPROCEDURE EVALUATION
06/21/2018  Marquita Roe is a 52 y.o., female with right breast mass here for excision.    Pre-operative evaluation for Procedure(s) (LRB):  EXCISIONAL BIOPSY-breast  w/SEED LOCALIZATION (Right)  EXCISION-DUCT Duct End Breast (Right)        Patient Active Problem List   Diagnosis    Breast mass, right       Review of patient's allergies indicates:  No Known Allergies    No current facility-administered medications on file prior to encounter.      Current Outpatient Prescriptions on File Prior to Encounter   Medication Sig Dispense Refill    atenolol-chlorthalidone (TENORETIC) 50-25 mg Tab Take 1 tablet by mouth every morning.  3    aspirin 81 MG Chew Take 81 mg by mouth every morning.          Past Surgical History:   Procedure Laterality Date    ANTERIOR CRUCIATE LIGAMENT REPAIR      NOSE SURGERY         Social History     Social History    Marital status:      Spouse name: N/A    Number of children: N/A    Years of education: N/A     Occupational History    Not on file.     Social History Main Topics    Smoking status: Never Smoker    Smokeless tobacco: Never Used    Alcohol use Not on file    Drug use: Unknown    Sexual activity: Not on file     Other Topics Concern    Not on file     Social History Narrative    No narrative on file         CBC: No results for input(s): WBC, RBC, HGB, HCT, PLT, MCV, MCH, MCHC in the last 72 hours.    CMP: No results for input(s): NA, K, CL, CO2, BUN, CREATININE, GLU, MG, PHOS, CALCIUM, ALBUMIN, PROT, ALKPHOS, ALT, AST, BILITOT in the last 72 hours.    INR  No results for input(s): PT, INR, PROTIME, APTT in the last 72 hours.          Anesthesia Evaluation    I have reviewed the Patient Summary Reports.    I have reviewed the Nursing Notes.   I have reviewed the Medications.     Review of Systems  Anesthesia Hx:  No problems with previous Anesthesia     Hematology/Oncology:  Hematology Normal   Oncology Normal   Oncology Comments: Right breast mass     EENT/Dental:EENT/Dental Normal   Cardiovascular:   Hypertension    Pulmonary:  Pulmonary Normal    Renal/:  Renal/ Normal     Hepatic/GI:  Hepatic/GI Normal    Musculoskeletal:  Musculoskeletal Normal    Neurological:  Neurology Normal    Endocrine:  Endocrine Normal    Dermatological:  Skin Normal    Psych:  Psychiatric Normal           Physical Exam  General:  Well nourished    Airway/Jaw/Neck:  Airway Findings: Mouth Opening: Normal Tongue: Normal  General Airway Assessment: Adult  Mallampati: I  Jaw/Neck Findings:  Neck ROM: Normal ROM     Eyes/Ears/Nose:  Eyes/Ears/Nose Findings:    Dental:  Dental Findings: In tact   Chest/Lungs:  Chest/Lungs Findings: Clear to auscultation, Normal Respiratory Rate     Heart/Vascular:  Heart Findings: Rate: Normal  Rhythm: Regular Rhythm  Sounds: Normal  Heart Murmur  Vascular Findings:        Mental Status:  Mental Status Findings:  Cooperative, Alert and Oriented         Anesthesia Plan  Type of Anesthesia, risks & benefits discussed:  Anesthesia Type:  general  Patient's Preference: General/LMA  Intra-op Monitoring Plan: standard ASA monitors  Intra-op Monitoring Plan Comments:   Post Op Pain Control Plan:   Post Op Pain Control Plan Comments: IV pain medications with transition to po as tolerated, discussed other pain modalities including regional anesthesia as appropriate  Induction:   IV  Beta Blocker:  Patient is not currently on a Beta-Blocker (No further documentation required).       Informed Consent: Patient understands risks and agrees with Anesthesia plan.  Questions answered. Anesthesia consent signed with patient.  ASA Score: 2     Day of Surgery Review of History & Physical:    H&P update referred to the surgeon.     Anesthesia Plan Notes: Discussed plan for General anesthesia with Laryngeal Mask Airway    Pt understands risks and agrees with  plan        Ready For Surgery From Anesthesia Perspective.

## 2018-06-22 NOTE — PLAN OF CARE
Patient and spouse state they are ready to be discharged. Instructions and narcotic prescription given to patient and family. Both verbalize understanding. Patient tolerating po liquids with no difficulty. Patient states pain is at a tolerable level for them. Anesthesia consent and surgical consent in chart upon patient's discharge from [facility].

## 2018-06-22 NOTE — DISCHARGE INSTRUCTIONS
POSTOPERATIVE INSTRUCTIONS FOLLOWING BREAST BIOPSY OR LUMPECTOMY    The following are post-operative instructions that will help you to recover from your surgery.  Please read over these instructions carefully and contact us if we can answer any of your questions or concerns.    Dressing/breast binder (surgi-bra)  A surgical bra may be placed around your chest after your surgery.  If you are given the bra, please wear it for the first 48 hours after surgery. After 48 hours you can remove your surgical bra and dressing to shower/cleanse the breast with antibacterial soap and warm water. Do not take a tub bath and do not soak the surgical site for at least 2 weeks. Please do not remove the white strips of tape (steri-strips) that cover your incision- they will be removed at your clinic visit.    The final pathology report will be available approximately 7-10 days after your surgery.  Our office will call you with your pathology report when it becomes available.    Dr. Bobo patients: please wear the surgical bra as close to 24 hours a day as possible until your post-operative clinic appointment.  If the elastic around the bra irritates your skin, you may wear a soft t-shirt underneath the bra. You may shower AFTER the drains are removed.  Please sponge bathe until then. You may go without wearing the bra long enough to bath, to launder and dry the bra. If you have fluffy filler placed inside the bra, the filler should be removed whenever the bra is taken off. Please reinsert the fluffy filler, or insert the new soft filler, under the bra when you put the bra back on.  If the bra is extremely uncomfortable, you may wear a supportive sports bra instead after 2 days.    Activity   You should be able to return to your regular activities 2 days after your surgery.  However, do not engage in strenuous activities in which you use your upper body such as:  golf, tennis, aerobics, washing windows, raking the yard, mopping,  vacuuming, heavy lifting (e.g children) until you are seen for your follow-up appointment in clinic. Do not lift anything heavier than a gallon of milk.    Medication for pain  You may find that over the counter pain medications may be sufficient for your pain.  You will be given a prescription for pain medication for more severe pain.  You should not drive or operate machinery while taking these.  Please take prescription pain medicine (narcotics) with food.  Narcotics can cause, or worsen, constipation.  You will need to increase your fluid intake, eat high fiber foods (such as fruits and bran) and make sure that you are up and walking. You may need to take an over the counter stool softener for constipation.    Please report the following:   Temperature greater than 101 degrees   Discharge or bad odor from the wound   Excessive bleeding, such as saturated bloody dressing or extreme bruising   Redness at incision and/or drain sites   Swelling or buildup of fluid around incision   Persistent fevers, chills, nausea, vomiting, or diarrhea    Additional information  Your surgeon will see you approximately 2 weeks following your surgery.  If this follow-up appointment has not been made, please call the office.    If you have any questions or problems, please call my office or my nurse.    Dr. Cortney Centeno, BETSEY Garcia, BETSEY Garcia, BETSEY Crenshaw RN  [phone]    Roxane Gates PA-C  [phone]  Jayna Gibbs RN  [phone]    After hours and on weekends, you may call the main [facility] line at [phone] and ask to have the general surgery resident paged or have me paged.      Recovery After Procedural Sedation (Adult)  You have been given medicine by vein to make you sleep during your surgery. This may have included both a pain medicine and sleeping medicine. Most  of the effects have worn off. But you may still have some drowsiness for the next 6 to 8 hours.  Home care  Follow these guidelines when you get home:  · For the next 8 hours, you should be watched by a responsible adult. This person should make sure your condition is not getting worse.  · Don't drink any alcohol for the next 24 hours.  · Don't drive, operate dangerous machinery, or make important business or personal decisions during the next 24 hours.  Note: Your healthcare provider may tell you not to take any medicine by mouth for pain or sleep in the next 4 hours. These medicines may react with the medicines you were given in the hospital. This could cause a much stronger response than usual.  Follow-up care  Follow up with your healthcare provider if you are not alert and back to your usual level of activity within 12 hours.  When to seek medical advice  Call your healthcare provider right away if any of these occur:  · Drowsiness gets worse  · Weakness or dizziness gets worse  · Repeated vomiting  · You can't be awakened   Date Last Reviewed: 10/18/2016  © 9023-2543 [employer]. [address] [place] [address], [place] [address]. All rights reserved. This information is not intended as a substitute for professional medical care. Always follow your healthcare professional's instructions.

## 2018-06-22 NOTE — ANESTHESIA POSTPROCEDURE EVALUATION
"Anesthesia Post Evaluation    Patient: Marquita Roe    Procedure(s) Performed: Procedure(s) (LRB):  EXCISIONAL BIOPSY-breast  w/SEED LOCALIZATION (Right)  EXCISION-DUCT Duct End Breast (Right)    Final Anesthesia Type: general  Patient location during evaluation: PACU  Patient participation: Yes- Able to Participate  Level of consciousness: awake and alert  Post-procedure vital signs: reviewed and stable  Pain management: adequate  Airway patency: patent  PONV status at discharge: No PONV  Anesthetic complications: no      Cardiovascular status: hemodynamically stable  Respiratory status: unassisted  Hydration status: euvolemic  Follow-up not needed.        Visit Vitals  /86   Pulse 84   Temp 36.8 °C (98.3 °F) (Skin)   Resp 18   Ht 5' 6" (1.676 m)   Wt 68 kg (150 lb)   SpO2 99%   Breastfeeding? No   BMI 24.21 kg/m²       Pain/Bianca Score: Pain Assessment Performed: Yes (6/21/2018  8:15 PM)  Presence of Pain: denies (6/21/2018  8:15 PM)  Pain Rating Prior to Med Admin: 0 (6/21/2018  3:25 PM)  Bianca Score: 10 (6/21/2018  8:15 PM)      "

## 2018-06-26 NOTE — OP NOTE
DATE OF PROCEDURE: 06/26/2018    SURGEON: Surgeon(s) and Role:     * Chayo Meraz MD - Primary     * Tien Mcleod MD - Resident - Assisting      PREOPERATIVE DIAGNOSIS: Abnormal mammogram of right breast [R92.8]  Discordant biopsy  Nipple discharge    POSTOPERATIVE DIAGNOSIS: same    ANESTHESIA: local and general    PROCEDURES PERFORMED:    right breast magseed localization excisional biopsy  Right breast end duct excision    CLINICAL HISTORY AND INDICATIONS FOR SURGERY:  Marquita Roe is a 53 y.o. female who presents with nipple discharge and slight inversion and abnormal MRI findings with biopsy discordant with findings.  The area of abnormal enhancement on MRI was biopsied and now has been localized with a magseed for excision.  Upon arrival to the operating room, nipple discharge was easily elicited although I have never been able to elicit any discharge in clinic.  Since we had discussed duct excision extensively in the office although I explained that I would not be able to do it since we couldn't elicit discharge, I proceeded to go ahead with attempting to excise the offending duct at the time of excisional biopsy.  All the attendant risks, benefits, and potential complications of surgery were discussed with her and she wanted to proceed with surgery and consented to surgery.    PROCEDURE IN DETAIL:   The patient underwent informed consent.  The seed was placed in the central of the right breast. The seed localization films were reviewed.    She was then brought to the Operating Room and placed in the supine position. Anesthesia with local and general was administered.  The right breast, anterior chest, right arm and axilla were then prepped and draped in a sterile fashion.     We turned our attention to the right breast. Local anesthetic was injected in the area. An incision was made in the lower outer quadrant of the right breast over the anticipated tract of the seed which was very difficult to  localize with the magseed externally due to poor signal. The specimen was dissected circumferentially around the lesion.  The seed localization lumpectomy specimen was marked using short stitch superior, long lateral.  It was then submitted for specimen radiograph, which confirmed the clip and area of interest within the specimen.     We then turned our attention to the discharging duct which was thin and serous, but did have a second of milky discharge from a single duct.  The duct was cannulated with a 3-0 nylon suture and seldinger technique was used advancing a 24 g angiocatheter over the nylon.  A very small amount of methylene blue was then injected to stain the ductal system attached to this duct.  The same periareolar incision was used to isolate the blue duct as it traveled just about 2cm deep into the breast just superior to the area that had been excised for enhancement.  Cautery and blunt dissection were used to come across this area circumferentially.  The small specimen was marked with a suture on the end duct with one long and one short tail, short superior, long lateral.    Within the lumpectomy cavity, hemostasis was achieved with cautery. The wound was irrigated until clear. There was no evidence of bleeding. It was closed in multiple layers with deep dermal and subcutaneous interrupted Vicryl sutures and a running 4-0 vicryl subcuticular skin closure.    Dermabond was applied. Sterile fluff gauze was placed and a post-procedure bra was placed. She tolerated the procedure well without complication and was turned over to Anesthesia for transport to the recovery area in a satisfactory condition. All specimens were sent to Pathology for permanent sectioning.    ESTIMATED BLOOD LOSS:   Minimal    COMPLICATIONS: none    DISPOSITION:  PACU--hemodynamically stable

## 2018-06-28 ENCOUNTER — TELEPHONE (OUTPATIENT)
Dept: SURGERY | Facility: CLINIC | Age: 53
End: 2018-06-28

## 2018-06-28 NOTE — TELEPHONE ENCOUNTER
Discussed path over the phone.  All benign.    She would rather continue to have her mammograms here and will likely do that when the next one is due.

## 2018-06-30 NOTE — PHYSICIAN QUERY
PT Name: Marquita Roe  MR #: [id number]     Physician Query Form - Documentation Clarification      CDS/: Yady Vitale               Contact information:    This form is a permanent document in the medical record.     Query Date: June 30, 2018    By submitting this query, we are merely seeking further clarification of documentation. Please utilize your independent clinical judgment when addressing the question(s) below.    The Medical record reflects the following:    Supporting Clinical Findings Location in Medical Record     Oper Note     DOS Correction needed. Patient was admitted 6/21/18. Oper Note DOS is 6/26/18     Oper Note     DOS Correction needed. Patient was admitted 6/21/18. Oper Note DOS is 6/26/18                                                                               Doctor, Please specify diagnosis or diagnoses associated with above clinical findings.    Provider Use Only                                                                                                                               [  ] Clinically undetermined

## 2018-06-30 NOTE — PHYSICIAN QUERY
PT Name: Marquita Roe  MR #: [id number]     Physician Query Form - Documentation Clarification      CDS/: Yady Vitale               Contact information:    This form is a permanent document in the medical record.     Query Date: June 30, 2018    By submitting this query, we are merely seeking further clarification of documentation. Please utilize your independent clinical judgment when addressing the question(s) below.    The Medical record reflects the following:    Supporting Clinical Findings Location in Medical Record     Oper Note     DOS Correction needed,6/21/18 date of Admission.Oper Note has 6/26/18.     Oper Note     DOS Correction needed,6/21/18 date of Admission.Oper Note has 6/26/18.                                                                            Doctor, Please specify diagnosis or diagnoses associated with above clinical findings.    Provider Use Only    The date of surgery was 6/21/18, NOT 6/26/18.                                                                                                                           [  ] Clinically undetermined

## 2018-07-11 ENCOUNTER — OFFICE VISIT (OUTPATIENT)
Dept: SURGERY | Facility: CLINIC | Age: 53
End: 2018-07-11
Payer: COMMERCIAL

## 2018-07-11 VITALS
HEART RATE: 54 BPM | DIASTOLIC BLOOD PRESSURE: 88 MMHG | TEMPERATURE: 98 F | WEIGHT: 154 LBS | SYSTOLIC BLOOD PRESSURE: 134 MMHG | HEIGHT: 66 IN | BODY MASS INDEX: 24.75 KG/M2

## 2018-07-11 DIAGNOSIS — N63.10 BREAST MASS, RIGHT: Primary | ICD-10-CM

## 2018-07-11 PROCEDURE — 99024 POSTOP FOLLOW-UP VISIT: CPT | Mod: S$GLB,,, | Performed by: SURGERY

## 2018-07-11 PROCEDURE — 99999 PR PBB SHADOW E&M-EST. PATIENT-LVL III: CPT | Mod: PBBFAC,,, | Performed by: SURGERY

## 2018-07-16 DIAGNOSIS — N64.53 NIPPLE RETRACTION: Primary | ICD-10-CM

## 2018-07-22 PROBLEM — N63.10 BREAST MASS, RIGHT: Status: RESOLVED | Noted: 2018-06-21 | Resolved: 2018-07-22

## 2018-07-23 NOTE — PROGRESS NOTES
REFERRING PHYSICIAN:         Manjeet Lino MD    DATE: 7/11/2018    DIAGNOSIS:    This is a 53 y.o. female with benign excisional biopsy of the right breast.    TREATMENT SUMMARY:  The patient is status post right excisional breast biopsy and separate end duct excision for nipple discharge.  Final pathology showed:    1. RIGHT BREAST, EXCISIONAL BIOPSY WITH MAGSEED:  - Extensive mammary duct ectasia.  - Fibrosis and biopsy site changes.  - Negative for atypia or malignancy.  - See comment.  2. RIGHT BREAST, END DUCT EXCISION:  - Extensive mammary duct ectasia.  - Negative for atypia or malignancy.  - See comment.  Comment: Mammary duct ectasia is characterized by varying amounts of periductal inflammation, periductal fibrosis  and duct dilation. Patients may present with pain, nipple discharge, nipple retraction and/or a mass. Though these  findings mimic a more serious condition, mammary duct ectasia is a benign condition.      INTERVAL HISTORY:   Marquita Roe comes in for a post-op check.  She denies fever, chills, chest pain or shortness of breath.  Her pain is well controlled.        PHYSICAL EXAMINATION:   General:  This is a well appearing female with appropriate speech, affect and gait.     Breast:  Incision clean, dry, and intact    IMPRESSION:   The patient has had an uneventful postoperative course, all benign findings.    PLAN:   1. return in March, 2019 for a follow up office visit and breast exam with bilateral mammogram same day  2. bilateral mammogram in March, 2019  3. The patient is advised in continued exam of the breast chest wall and to report to this office sooner should she note any areas of abnormality or concern.

## 2018-11-09 PROCEDURE — 90686 IIV4 VACC NO PRSV 0.5 ML IM: CPT | Mod: S$GLB,,, | Performed by: FAMILY MEDICINE

## 2018-11-09 PROCEDURE — 90471 IMMUNIZATION ADMIN: CPT | Mod: S$GLB,,, | Performed by: FAMILY MEDICINE

## 2018-12-03 ENCOUNTER — PATIENT MESSAGE (OUTPATIENT)
Dept: SURGERY | Facility: CLINIC | Age: 53
End: 2018-12-03

## 2018-12-13 ENCOUNTER — IMMUNIZATION (OUTPATIENT)
Dept: FAMILY MEDICINE | Facility: CLINIC | Age: 53
End: 2018-12-13
Payer: COMMERCIAL

## 2019-01-29 ENCOUNTER — CLINICAL SUPPORT (OUTPATIENT)
Dept: FAMILY MEDICINE | Facility: CLINIC | Age: 54
End: 2019-01-29
Payer: COMMERCIAL

## 2019-06-14 ENCOUNTER — TELEPHONE (OUTPATIENT)
Dept: SURGERY | Facility: CLINIC | Age: 54
End: 2019-06-14

## 2019-06-14 DIAGNOSIS — Z12.31 VISIT FOR SCREENING MAMMOGRAM: Primary | ICD-10-CM

## 2019-06-14 NOTE — TELEPHONE ENCOUNTER
----- Message from Chriss Garcia RN sent at 6/14/2019 11:36 AM CDT -----  Contact: Patient   Can you call pt to let her know why? I'm sure you already did, but they don't understand that diagnostics are not, not covered, their insurance just charges for this type of exam due to the reason as to radiology recommendation for f/u on discordant bx/exc. Chriss  ----- Message -----  From: Basia Small  Sent: 6/14/2019  10:57 AM  To: [facility] Staff    Needs Advice    Reason for call: Patient states preservice called her and told her that the mmg was not going to be covered, and needed to be recoded otherwise she would have to pay $400 out of pocket         Communication Preference: [phone]     Additional Information: please contact the patient

## 2019-06-14 NOTE — TELEPHONE ENCOUNTER
Contacted the patient regarding the message below.  Explained to the patient that she is scheduled for a diagnostic mammogram due to follow up from breast bx and procedure last year.  Explained that the mammogram order can be changed if she would like since she has not had the mammogram yet.  Patient stated that she would like to have the mammogram order changed as she does not want to pay $400 for the mammogram.  Order changed to a screening mammogram per patient's request.

## 2019-06-17 ENCOUNTER — HOSPITAL ENCOUNTER (OUTPATIENT)
Dept: RADIOLOGY | Facility: HOSPITAL | Age: 54
Discharge: HOME | End: 2019-06-17
Attending: SURGERY
Payer: COMMERCIAL

## 2019-06-17 ENCOUNTER — OFFICE VISIT (OUTPATIENT)
Dept: SURGERY | Facility: CLINIC | Age: 54
End: 2019-06-17
Payer: COMMERCIAL

## 2019-06-17 VITALS
SYSTOLIC BLOOD PRESSURE: 122 MMHG | HEIGHT: 66 IN | TEMPERATURE: 98 F | HEART RATE: 49 BPM | BODY MASS INDEX: 24.86 KG/M2 | DIASTOLIC BLOOD PRESSURE: 82 MMHG

## 2019-06-17 DIAGNOSIS — R92.8 ABNORMAL MAMMOGRAM OF RIGHT BREAST: ICD-10-CM

## 2019-06-17 DIAGNOSIS — R92.8 ABNORMAL MAMMOGRAM OF RIGHT BREAST: Primary | ICD-10-CM

## 2019-06-17 DIAGNOSIS — Z12.31 VISIT FOR SCREENING MAMMOGRAM: ICD-10-CM

## 2019-06-17 PROCEDURE — 77061 BREAST TOMOSYNTHESIS UNI: CPT | Mod: GG,TC,PO

## 2019-06-17 PROCEDURE — 77061 MAMMO DIGITAL DIAGNOSTIC RIGHT WITH TOMOSYNTHESIS_CAD: ICD-10-PCS | Mod: 26,GG,, | Performed by: RADIOLOGY

## 2019-06-17 PROCEDURE — 77067 SCR MAMMO BI INCL CAD: CPT | Mod: TC,PO

## 2019-06-17 PROCEDURE — 99999 PR PBB SHADOW E&M-EST. PATIENT-LVL III: CPT | Mod: PBBFAC,,, | Performed by: SURGERY

## 2019-06-17 PROCEDURE — 99212 PR OFFICE/OUTPT VISIT, EST, LEVL II, 10-19 MIN: ICD-10-PCS | Mod: S$GLB,,, | Performed by: SURGERY

## 2019-06-17 PROCEDURE — 77065 MAMMO DIGITAL DIAGNOSTIC RIGHT WITH TOMOSYNTHESIS_CAD: ICD-10-PCS | Mod: 26,GG,, | Performed by: RADIOLOGY

## 2019-06-17 PROCEDURE — 77067 SCR MAMMO BI INCL CAD: CPT | Mod: 26,,, | Performed by: RADIOLOGY

## 2019-06-17 PROCEDURE — 77067 MAMMO DIGITAL SCREENING BILAT WITH TOMOSYNTHESIS_CAD: ICD-10-PCS | Mod: 26,,, | Performed by: RADIOLOGY

## 2019-06-17 PROCEDURE — 77065 DX MAMMO INCL CAD UNI: CPT | Mod: GG,TC,PO

## 2019-06-17 PROCEDURE — 77063 BREAST TOMOSYNTHESIS BI: CPT | Mod: 26,,, | Performed by: RADIOLOGY

## 2019-06-17 PROCEDURE — 77065 DX MAMMO INCL CAD UNI: CPT | Mod: 26,GG,, | Performed by: RADIOLOGY

## 2019-06-17 PROCEDURE — 99212 OFFICE O/P EST SF 10 MIN: CPT | Mod: S$GLB,,, | Performed by: SURGERY

## 2019-06-17 PROCEDURE — 3008F BODY MASS INDEX DOCD: CPT | Mod: S$GLB,,, | Performed by: SURGERY

## 2019-06-17 PROCEDURE — 77063 MAMMO DIGITAL SCREENING BILAT WITH TOMOSYNTHESIS_CAD: ICD-10-PCS | Mod: 26,,, | Performed by: RADIOLOGY

## 2019-06-17 PROCEDURE — 77061 BREAST TOMOSYNTHESIS UNI: CPT | Mod: 26,GG,, | Performed by: RADIOLOGY

## 2019-06-17 PROCEDURE — 99999 PR PBB SHADOW E&M-EST. PATIENT-LVL III: ICD-10-PCS | Mod: PBBFAC,,, | Performed by: SURGERY

## 2019-06-17 PROCEDURE — 3008F PR BODY MASS INDEX (BMI) DOCUMENTED: ICD-10-PCS | Mod: S$GLB,,, | Performed by: SURGERY

## 2019-06-17 RX ORDER — LOVASTATIN 10 MG/1
10 TABLET ORAL DAILY
Refills: 3 | COMMUNITY
Start: 2019-05-16 | End: 2021-12-29 | Stop reason: ALTCHOICE

## 2019-06-17 RX ORDER — FENOFIBRATE 54 MG/1
TABLET ORAL
Refills: 2 | COMMUNITY
Start: 2019-06-06 | End: 2022-02-02 | Stop reason: SDUPTHER

## 2019-06-17 NOTE — PROGRESS NOTES
REFERRING PHYSICIAN:         Manjeet Lino MD    DATE: 6/17/2019    DIAGNOSIS:    This is a 53 y.o. female with benign excisional biopsy of the right breast.    TREATMENT SUMMARY:  The patient is status post right excisional breast biopsy and separate end duct excision for nipple discharge on 06/21/18.  Final pathology showed:    1. RIGHT BREAST, EXCISIONAL BIOPSY WITH MAGSEED:  - Extensive mammary duct ectasia.  - Fibrosis and biopsy site changes.  - Negative for atypia or malignancy.  - See comment.    2. RIGHT BREAST, END DUCT EXCISION:  - Extensive mammary duct ectasia.   - Negative for atypia or malignancy.  - See comment.  Comment: Mammary duct ectasia is characterized by varying amounts of periductal inflammation, periductal fibrosis  and duct dilation. Patients may present with pain, nipple discharge, nipple retraction and/or a mass. Though these  findings mimic a more serious condition, mammary duct ectasia is a benign condition.    INTERVAL HISTORY:   Marquita Roe comes in for follow up mmg and CBE. Notes some small twinges of pain right breast every so often, very minimal in severity. Otherwise no nipple discharge, skin changes, masses, or any other complaints at this time. MMG today is BIRADS 0 and needs diagnostic imaging.    PHYSICAL EXAMINATION:   Physical Exam   Constitutional: She appears well-developed and well-nourished.   HENT:   Head: Normocephalic.   Eyes: No scleral icterus.   Neck: Neck supple. No tracheal deviation present.   Cardiovascular: Normal rate and regular rhythm.    Pulmonary/Chest: Breath sounds normal. No respiratory distress. Right breast exhibits no inverted nipple, no mass, no nipple discharge and no skin change. Left breast exhibits no inverted nipple, no mass, no nipple discharge and no skin change.       Abdominal: Soft. She exhibits no mass. There is no tenderness.   Musculoskeletal: She exhibits no edema.   Lymphadenopathy:     She has no cervical adenopathy.    Neurological: She is alert.   Skin: No rash noted. No erythema.     Psychiatric: She has a normal mood and affect.       Radiology Review:  Findings:  This procedure was performed using tomosynthesis.  Computer-aided detection was utilized in the interpretation of this examination.  The breasts have scattered areas of fibroglandular density.      Right  There is architectural distortion seen in the upper region of the right breast in the middle depth.      Left  There is no evidence of suspicious masses, calcifications, or other abnormal findings.     Impression:  Right  Architectural Distortion: Right breast architectural distortion at the upper middle position. Assessment: 0 - Incomplete. Diagnostic Mammogram and/or Ultrasound is recommended.      Left  There is no mammographic evidence of malignancy.     BI-RADS Category:   Overall: 0 - Incomplete: Needs Additional Imaging Evaluation    IMPRESSION:   53-year-old female status post right excisional breast biopsy and separate end duct excision for nipple discharge on 06/21/18 here for MMG and CBE today. Screening MMG shows architectural distortion upper region of right breast.     PLAN:   1. Patient needs diagnostic mammogram with US possible biopsy if indicated  2. Follow up results.  3. The patient is advised in continued exam of the breast chest wall and to report to this office sooner should she note any areas of abnormality or concern.   4. Otherwise could return PRN, will decide after workup complete.

## 2020-12-09 PROBLEM — Z20.822 EXPOSURE TO COVID-19 VIRUS: Status: ACTIVE | Noted: 2020-12-09

## 2021-12-23 ENCOUNTER — HOSPITAL ENCOUNTER (OUTPATIENT)
Dept: RADIOLOGY | Facility: HOSPITAL | Age: 56
Discharge: HOME | End: 2021-12-23
Payer: COMMERCIAL

## 2021-12-23 VITALS — HEIGHT: 66 IN | WEIGHT: 150 LBS | BODY MASS INDEX: 24.11 KG/M2

## 2021-12-23 DIAGNOSIS — Z12.31 ENCOUNTER FOR SCREENING MAMMOGRAM FOR MALIGNANT NEOPLASM OF BREAST: ICD-10-CM

## 2021-12-23 PROCEDURE — 77067 MAMMO DIGITAL SCREENING BILAT WITH TOMO: ICD-10-PCS | Mod: 26,,, | Performed by: RADIOLOGY

## 2021-12-23 PROCEDURE — 77067 SCR MAMMO BI INCL CAD: CPT | Mod: 26,,, | Performed by: RADIOLOGY

## 2021-12-23 PROCEDURE — 77067 SCR MAMMO BI INCL CAD: CPT | Mod: TC

## 2021-12-23 PROCEDURE — 77063 MAMMO DIGITAL SCREENING BILAT WITH TOMO: ICD-10-PCS | Mod: 26,,, | Performed by: RADIOLOGY

## 2021-12-23 PROCEDURE — 77063 BREAST TOMOSYNTHESIS BI: CPT | Mod: 26,,, | Performed by: RADIOLOGY

## 2022-02-02 ENCOUNTER — HOSPITAL ENCOUNTER (OUTPATIENT)
Dept: RADIOLOGY | Facility: HOSPITAL | Age: 57
Discharge: HOME | End: 2022-02-02
Attending: PODIATRIST
Payer: COMMERCIAL

## 2022-02-02 ENCOUNTER — OFFICE VISIT (OUTPATIENT)
Dept: PODIATRY | Facility: CLINIC | Age: 57
End: 2022-02-02
Payer: COMMERCIAL

## 2022-02-02 VITALS
DIASTOLIC BLOOD PRESSURE: 79 MMHG | SYSTOLIC BLOOD PRESSURE: 133 MMHG | HEIGHT: 66 IN | BODY MASS INDEX: 24.11 KG/M2 | RESPIRATION RATE: 16 BRPM | WEIGHT: 150 LBS | HEART RATE: 56 BPM

## 2022-02-02 DIAGNOSIS — M79.672 FOOT PAIN, BILATERAL: ICD-10-CM

## 2022-02-02 DIAGNOSIS — D36.10 NEUROMA: Primary | ICD-10-CM

## 2022-02-02 DIAGNOSIS — M79.671 FOOT PAIN, BILATERAL: ICD-10-CM

## 2022-02-02 DIAGNOSIS — D36.10 NEUROMA: ICD-10-CM

## 2022-02-02 PROCEDURE — 1159F PR MEDICATION LIST DOCUMENTED IN MEDICAL RECORD: ICD-10-PCS | Mod: S$GLB,,, | Performed by: PODIATRIST

## 2022-02-02 PROCEDURE — 3008F BODY MASS INDEX DOCD: CPT | Mod: S$GLB,,, | Performed by: PODIATRIST

## 2022-02-02 PROCEDURE — 73630 XR FOOT COMPLETE 3 VIEW LEFT: ICD-10-PCS | Mod: 26,LT,, | Performed by: RADIOLOGY

## 2022-02-02 PROCEDURE — 3075F PR MOST RECENT SYSTOLIC BLOOD PRESS GE 130-139MM HG: ICD-10-PCS | Mod: S$GLB,,, | Performed by: PODIATRIST

## 2022-02-02 PROCEDURE — 99204 OFFICE O/P NEW MOD 45 MIN: CPT | Mod: S$GLB,,, | Performed by: PODIATRIST

## 2022-02-02 PROCEDURE — 3008F PR BODY MASS INDEX (BMI) DOCUMENTED: ICD-10-PCS | Mod: S$GLB,,, | Performed by: PODIATRIST

## 2022-02-02 PROCEDURE — 99999 PR PBB SHADOW E&M-EST. PATIENT-LVL III: ICD-10-PCS | Mod: PBBFAC,,, | Performed by: PODIATRIST

## 2022-02-02 PROCEDURE — 1159F MED LIST DOCD IN RCRD: CPT | Mod: S$GLB,,, | Performed by: PODIATRIST

## 2022-02-02 PROCEDURE — 3075F SYST BP GE 130 - 139MM HG: CPT | Mod: S$GLB,,, | Performed by: PODIATRIST

## 2022-02-02 PROCEDURE — 1160F RVW MEDS BY RX/DR IN RCRD: CPT | Mod: S$GLB,,, | Performed by: PODIATRIST

## 2022-02-02 PROCEDURE — 1160F PR REVIEW ALL MEDS BY PRESCRIBER/CLIN PHARMACIST DOCUMENTED: ICD-10-PCS | Mod: S$GLB,,, | Performed by: PODIATRIST

## 2022-02-02 PROCEDURE — 3078F DIAST BP <80 MM HG: CPT | Mod: S$GLB,,, | Performed by: PODIATRIST

## 2022-02-02 PROCEDURE — 99204 PR OFFICE/OUTPT VISIT, NEW, LEVL IV, 45-59 MIN: ICD-10-PCS | Mod: S$GLB,,, | Performed by: PODIATRIST

## 2022-02-02 PROCEDURE — 3078F PR MOST RECENT DIASTOLIC BLOOD PRESSURE < 80 MM HG: ICD-10-PCS | Mod: S$GLB,,, | Performed by: PODIATRIST

## 2022-02-02 PROCEDURE — 73630 X-RAY EXAM OF FOOT: CPT | Mod: TC,FY,LT

## 2022-02-02 PROCEDURE — 99999 PR PBB SHADOW E&M-EST. PATIENT-LVL III: CPT | Mod: PBBFAC,,, | Performed by: PODIATRIST

## 2022-02-02 PROCEDURE — 73630 X-RAY EXAM OF FOOT: CPT | Mod: 26,LT,, | Performed by: RADIOLOGY

## 2022-02-02 RX ORDER — FENOFIBRATE 54 MG/1
54 TABLET ORAL NIGHTLY
COMMUNITY
Start: 2021-07-06 | End: 2022-06-23 | Stop reason: SDUPTHER

## 2022-02-02 RX ORDER — LOVASTATIN 20 MG/1
20 TABLET ORAL NIGHTLY
COMMUNITY
Start: 2021-11-18 | End: 2022-06-23 | Stop reason: SDUPTHER

## 2022-02-02 RX ORDER — MELOXICAM 15 MG/1
15 TABLET ORAL DAILY
Qty: 30 TABLET | Refills: 1 | Status: SHIPPED | OUTPATIENT
Start: 2022-02-02 | End: 2022-03-04

## 2022-02-02 RX ORDER — LOVASTATIN 20 MG/1
TABLET ORAL
COMMUNITY
Start: 2021-11-18 | End: 2022-02-02 | Stop reason: SDUPTHER

## 2022-02-05 NOTE — PROGRESS NOTES
Subjective:       Patient ID: Marquita Roe is a 56 y.o. female.    Chief Complaint: Foot Problem and Numbness  Patient presents today for a new patient evaluation she is complaining of bilateral foot pain that is especially bad when she walks long distances she feels burning in her toes and discomfort especially under the 3rd toe on her left foot.  Patient states her left foot starting hurting her this past summer with burning on the ball the foot she has this him same discomfort in that right foot however it is not as severe and her symptoms started in the left foot.  Patient is worn all different types of shoes she states nothing has really helped.    Past Medical History:   Diagnosis Date    Breast mass, right     Discharge from right nipple     Hyperlipidemia     Hypertension     Inversion of nipple     Right     Past Surgical History:   Procedure Laterality Date    ANTERIOR CRUCIATE LIGAMENT REPAIR      BREAST BIOPSY Right 2018    core bx:Benign breast tissue with duct ectasia surrounded by chronic inflammation and fibrosis    BREAST BIOPSY Right 06/21/2018    exc bx:- Extensive mammary duct ectasia    EXCISIONAL BIOPSY Right 6/21/2018    Procedure: EXCISIONAL BIOPSY-breast  w/SEED LOCALIZATION;  Surgeon: Chayo Meraz MD;  Location: [facility] OR [address] [place] [address];  Service: General;  Laterality: Right;    NOSE SURGERY       Family History   Problem Relation Age of Onset    Cancer Mother 69        Lung - Smoker     Social History     Socioeconomic History    Marital status:    Tobacco Use    Smoking status: Never Smoker    Smokeless tobacco: Never Used   Substance and Sexual Activity    Alcohol use: Not Currently    Sexual activity: Yes       Current Outpatient Medications   Medication Sig Dispense Refill    atenolol-chlorthalidone (TENORETIC) 50-25 mg Tab Take 1 tablet by mouth every morning.  3    fenofibrate (TRICOR) 54 MG tablet 54 mg.      lovastatin (MEVACOR) 20 MG tablet       meloxicam  "(MOBIC) 15 MG tablet Take 1 tablet (15 mg total) by mouth once daily. 30 tablet 1     No current facility-administered medications for this visit.     Review of patient's allergies indicates:  No Known Allergies    Review of Systems   Musculoskeletal: Positive for arthralgias and joint swelling.   All other systems reviewed and are negative.      Objective:      Vitals:    02/02/22 1551   BP: 133/79   Pulse: (!) 56   Resp: 16   Weight: 68 kg (150 lb)   Height: 5' 6" (1.676 m)     Physical Exam  Vitals and nursing note reviewed.   Constitutional:       Appearance: Normal appearance.   Cardiovascular:      Pulses:           Dorsalis pedis pulses are 2+ on the right side and 2+ on the left side.        Posterior tibial pulses are 2+ on the right side and 2+ on the left side.   Pulmonary:      Effort: Pulmonary effort is normal.   Musculoskeletal:         General: Swelling and tenderness present.      Right foot: Deformity present.      Left foot: Deformity present.        Feet:    Feet:      Right foot:      Protective Sensation: 2 sites tested. 2 sites sensed.      Left foot:      Protective Sensation: 2 sites tested. 2 sites sensed.      Skin integrity: Erythema present.   Skin:     Capillary Refill: Capillary refill takes less than 2 seconds.      Findings: Erythema present.   Neurological:      General: No focal deficit present.      Mental Status: She is alert.   Psychiatric:         Mood and Affect: Mood normal.         Behavior: Behavior normal.         Thought Content: Thought content normal.         Judgment: Judgment normal.                                          Assessment:       1. Neuroma    2. Foot pain, bilateral        Plan:       Patient presents today for a new patient evaluation she is complaining of bilateral foot pain that is especially bad when she walks long distances she feels burning in her toes and discomfort especially under the 3rd toe on her left foot.  Patient states her left foot " starting hurting her this past summer with burning on the ball the foot she has this him same discomfort in that right foot however it is not as severe and her symptoms started in the left foot.  Patient is worn all different types of shoes she states nothing has really helped.  A comprehensive new patient evaluation was performed today patient does have significantly elevated arches both weight-bearing and nonweightbearing bilateral.  Patient has findings consistent with neuroma this appears most prominent in the plantar 2nd webspace left and 3rd webspace to a lesser extent as well as 2nd and 3rd webspace right foot however the patient's left foot is most significant at this time.  Patient does have notable soft tissue swelling and inflammation this is most notable on the plantar aspect of the 2nd webspace left.  I did take x-rays today review these with the patient advised the patient that she does have narrowing between the 2nd and 3rd metatarsals which is consistent with neuroma left.  Patient advised she is clearly not getting enough support that is why her pain is not being relieved even when she has tried different shoes I have recommended starting the patient on Mobic 15 mg daily and dispense the patient some power step full length arch supports advised the patient this is simply a starting point I do feel she may need additional arch support or a metatarsal pad however I want see how she does with this over the next several weeks and then I will re-evaluate her in 2-3 weeks.  Patient is to discontinue all barefoot walking where the power steps at all times contact us with any problems questions or concerns prior to her scheduled follow-up.This note was created using [employer] voice recognition software that occasionally misinterpreted phrases or words.

## 2022-02-23 ENCOUNTER — OFFICE VISIT (OUTPATIENT)
Dept: PODIATRY | Facility: CLINIC | Age: 57
End: 2022-02-23
Payer: COMMERCIAL

## 2022-02-23 VITALS
WEIGHT: 150 LBS | SYSTOLIC BLOOD PRESSURE: 117 MMHG | HEART RATE: 59 BPM | RESPIRATION RATE: 18 BRPM | HEIGHT: 66 IN | BODY MASS INDEX: 24.11 KG/M2 | DIASTOLIC BLOOD PRESSURE: 77 MMHG

## 2022-02-23 DIAGNOSIS — D36.10 NEUROMA: Primary | ICD-10-CM

## 2022-02-23 PROCEDURE — 1160F RVW MEDS BY RX/DR IN RCRD: CPT | Mod: S$GLB,,, | Performed by: PODIATRIST

## 2022-02-23 PROCEDURE — 99999 PR PBB SHADOW E&M-EST. PATIENT-LVL IV: CPT | Mod: PBBFAC,,, | Performed by: PODIATRIST

## 2022-02-23 PROCEDURE — 1159F MED LIST DOCD IN RCRD: CPT | Mod: S$GLB,,, | Performed by: PODIATRIST

## 2022-02-23 PROCEDURE — 1160F PR REVIEW ALL MEDS BY PRESCRIBER/CLIN PHARMACIST DOCUMENTED: ICD-10-PCS | Mod: S$GLB,,, | Performed by: PODIATRIST

## 2022-02-23 PROCEDURE — 3074F SYST BP LT 130 MM HG: CPT | Mod: S$GLB,,, | Performed by: PODIATRIST

## 2022-02-23 PROCEDURE — 3078F PR MOST RECENT DIASTOLIC BLOOD PRESSURE < 80 MM HG: ICD-10-PCS | Mod: S$GLB,,, | Performed by: PODIATRIST

## 2022-02-23 PROCEDURE — 3008F PR BODY MASS INDEX (BMI) DOCUMENTED: ICD-10-PCS | Mod: S$GLB,,, | Performed by: PODIATRIST

## 2022-02-23 PROCEDURE — 1159F PR MEDICATION LIST DOCUMENTED IN MEDICAL RECORD: ICD-10-PCS | Mod: S$GLB,,, | Performed by: PODIATRIST

## 2022-02-23 PROCEDURE — 3074F PR MOST RECENT SYSTOLIC BLOOD PRESSURE < 130 MM HG: ICD-10-PCS | Mod: S$GLB,,, | Performed by: PODIATRIST

## 2022-02-23 PROCEDURE — 3008F BODY MASS INDEX DOCD: CPT | Mod: S$GLB,,, | Performed by: PODIATRIST

## 2022-02-23 PROCEDURE — 3078F DIAST BP <80 MM HG: CPT | Mod: S$GLB,,, | Performed by: PODIATRIST

## 2022-02-23 PROCEDURE — 99214 OFFICE O/P EST MOD 30 MIN: CPT | Mod: S$GLB,,, | Performed by: PODIATRIST

## 2022-02-23 PROCEDURE — 99999 PR PBB SHADOW E&M-EST. PATIENT-LVL IV: ICD-10-PCS | Mod: PBBFAC,,, | Performed by: PODIATRIST

## 2022-02-23 PROCEDURE — 99214 PR OFFICE/OUTPT VISIT, EST, LEVL IV, 30-39 MIN: ICD-10-PCS | Mod: S$GLB,,, | Performed by: PODIATRIST

## 2022-02-27 NOTE — PROGRESS NOTES
"Subjective:       Patient ID: Marquita Roe is a 56 y.o. female.    Chief Complaint: Follow-up and Foot Pain  Patient presents today for follow-up neuroma bilateral.    Past Medical History:   Diagnosis Date    Breast mass, right     Discharge from right nipple     Hyperlipidemia     Hypertension     Inversion of nipple     Right     Past Surgical History:   Procedure Laterality Date    ANTERIOR CRUCIATE LIGAMENT REPAIR      BREAST BIOPSY Right 2018    core bx:Benign breast tissue with duct ectasia surrounded by chronic inflammation and fibrosis    BREAST BIOPSY Right 06/21/2018    exc bx:- Extensive mammary duct ectasia    EXCISIONAL BIOPSY Right 6/21/2018    Procedure: EXCISIONAL BIOPSY-breast  w/SEED LOCALIZATION;  Surgeon: Chayo Meraz MD;  Location: [facility] OR [address] [place] [address];  Service: General;  Laterality: Right;    NOSE SURGERY       Family History   Problem Relation Age of Onset    Cancer Mother 69        Lung - Smoker     Social History     Socioeconomic History    Marital status:    Tobacco Use    Smoking status: Never Smoker    Smokeless tobacco: Never Used   Substance and Sexual Activity    Alcohol use: Not Currently    Sexual activity: Yes       Current Outpatient Medications   Medication Sig Dispense Refill    fenofibrate (TRICOR) 54 MG tablet 54 mg.      lovastatin (MEVACOR) 20 MG tablet       meloxicam (MOBIC) 15 MG tablet Take 1 tablet (15 mg total) by mouth once daily. 30 tablet 1    atenolol-chlorthalidone (TENORETIC) 50-25 mg Tab Take 1 tablet by mouth every morning.  3     No current facility-administered medications for this visit.     Review of patient's allergies indicates:  No Known Allergies    Review of Systems   Musculoskeletal: Positive for arthralgias and joint swelling.   All other systems reviewed and are negative.      Objective:      Vitals:    02/23/22 1555   BP: 117/77   Pulse: (!) 59   Resp: 18   Weight: 68 kg (150 lb)   Height: 5' 6" (1.676 m)     Physical " Exam  Vitals and nursing note reviewed.   Constitutional:       Appearance: Normal appearance.   Cardiovascular:      Pulses:           Dorsalis pedis pulses are 2+ on the right side and 2+ on the left side.        Posterior tibial pulses are 2+ on the right side and 2+ on the left side.   Pulmonary:      Effort: Pulmonary effort is normal.   Musculoskeletal:         General: Swelling and tenderness present.      Right foot: Deformity present.      Left foot: Deformity present.        Feet:    Feet:      Right foot:      Protective Sensation: 2 sites tested. 2 sites sensed.      Left foot:      Protective Sensation: 2 sites tested. 2 sites sensed.      Skin integrity: Erythema present.   Skin:     Capillary Refill: Capillary refill takes less than 2 seconds.      Findings: Erythema present.   Neurological:      General: No focal deficit present.      Mental Status: She is alert.   Psychiatric:         Mood and Affect: Mood normal.         Behavior: Behavior normal.         Thought Content: Thought content normal.         Judgment: Judgment normal.              Assessment:       1. Neuroma        Plan:       Patient presents today for follow-up neuroma bilateral.  Patient states that she has been wearing the power step full length arch supports she has also been wearing the Oofos that I had recommended she states she does still have some degree of pain especially by the end of the day even with the power steps.  Patient does have less pain she has got less inflammation but she still has notable signs of neuroma this is especially true in the plantar aspect of the patient's left foot primarily the 3rd webspace.  Patient states she is tolerating the Mobic 15 mg daily very well I do want her to stay on the Mobic I also advised the patient I do feel that she needs more aggressive support I added additional blue arch padding to the plantar arch of the patient's full length power steps bilateral I want her to continue to  wear these I advised the patient we may have to consider adding a metatarsal pad but I want see how she does with the additional support 1st.  I did discuss with the patient possibly returning the Oofos that she purchased because while they are comfortable they are very expensive and I do not feel they have enough support for what the patient needs I would rather see her purchase some Vionic shoes that would likely have much more support or she could even use her power steps in the shoes my intention was when I recommended the Oofos was to just use these to wear in the house but the patient has been actually wearing them to work.  Recommended follow-up will be over the next several weeks depending upon how the patient responds to the additional support she is going to continue taking the meloxicam certainly if she has any problems questions or concerns she is going to contact us she should be doing considerably better over the next after several weeks if not I want see her for further evaluation and treatment.  This note was created using [employer] voice recognition software that occasionally misinterpreted phrases or words.

## 2022-06-23 ENCOUNTER — PATIENT OUTREACH (OUTPATIENT)
Dept: ADMINISTRATIVE | Facility: HOSPITAL | Age: 57
End: 2022-06-23
Payer: COMMERCIAL

## 2022-06-23 ENCOUNTER — LAB VISIT (OUTPATIENT)
Dept: LAB | Facility: HOSPITAL | Age: 57
End: 2022-06-23
Attending: FAMILY MEDICINE
Payer: COMMERCIAL

## 2022-06-23 ENCOUNTER — OFFICE VISIT (OUTPATIENT)
Dept: FAMILY MEDICINE | Facility: CLINIC | Age: 57
End: 2022-06-23
Payer: COMMERCIAL

## 2022-06-23 VITALS
OXYGEN SATURATION: 97 % | HEIGHT: 66 IN | DIASTOLIC BLOOD PRESSURE: 82 MMHG | BODY MASS INDEX: 24.97 KG/M2 | SYSTOLIC BLOOD PRESSURE: 135 MMHG | WEIGHT: 155.38 LBS | HEART RATE: 57 BPM

## 2022-06-23 DIAGNOSIS — Z00.00 ANNUAL PHYSICAL EXAM: Primary | ICD-10-CM

## 2022-06-23 DIAGNOSIS — E78.5 HYPERLIPIDEMIA, UNSPECIFIED HYPERLIPIDEMIA TYPE: ICD-10-CM

## 2022-06-23 DIAGNOSIS — I10 PRIMARY HYPERTENSION: ICD-10-CM

## 2022-06-23 DIAGNOSIS — Z00.00 ANNUAL PHYSICAL EXAM: ICD-10-CM

## 2022-06-23 LAB
ALBUMIN SERPL BCP-MCNC: 4.6 G/DL (ref 3.5–5.2)
ALP SERPL-CCNC: 34 U/L (ref 55–135)
ALT SERPL W/O P-5'-P-CCNC: 25 U/L (ref 10–44)
ANION GAP SERPL CALC-SCNC: 10 MMOL/L (ref 8–16)
AST SERPL-CCNC: 23 U/L (ref 10–40)
BASOPHILS # BLD AUTO: 0.03 K/UL (ref 0–0.2)
BASOPHILS NFR BLD: 0.5 % (ref 0–1.9)
BILIRUB SERPL-MCNC: 0.6 MG/DL (ref 0.1–1)
BUN SERPL-MCNC: 16 MG/DL (ref 6–20)
CALCIUM SERPL-MCNC: 10.4 MG/DL (ref 8.7–10.5)
CHLORIDE SERPL-SCNC: 102 MMOL/L (ref 95–110)
CHOLEST SERPL-MCNC: 212 MG/DL (ref 120–199)
CHOLEST/HDLC SERPL: 4.5 {RATIO} (ref 2–5)
CO2 SERPL-SCNC: 29 MMOL/L (ref 23–29)
CREAT SERPL-MCNC: 0.8 MG/DL (ref 0.5–1.4)
DIFFERENTIAL METHOD: NORMAL
EOSINOPHIL # BLD AUTO: 0.2 K/UL (ref 0–0.5)
EOSINOPHIL NFR BLD: 3.1 % (ref 0–8)
ERYTHROCYTE [DISTWIDTH] IN BLOOD BY AUTOMATED COUNT: 12.1 % (ref 11.5–14.5)
EST. GFR  (AFRICAN AMERICAN): >60 ML/MIN/1.73 M^2
EST. GFR  (NON AFRICAN AMERICAN): >60 ML/MIN/1.73 M^2
GLUCOSE SERPL-MCNC: 101 MG/DL (ref 70–110)
HCT VFR BLD AUTO: 43.6 % (ref 37–48.5)
HDLC SERPL-MCNC: 47 MG/DL (ref 40–75)
HDLC SERPL: 22.2 % (ref 20–50)
HGB BLD-MCNC: 15 G/DL (ref 12–16)
IMM GRANULOCYTES # BLD AUTO: 0.01 K/UL (ref 0–0.04)
IMM GRANULOCYTES NFR BLD AUTO: 0.2 % (ref 0–0.5)
LDLC SERPL CALC-MCNC: 125 MG/DL (ref 63–159)
LYMPHOCYTES # BLD AUTO: 2.5 K/UL (ref 1–4.8)
LYMPHOCYTES NFR BLD: 40.8 % (ref 18–48)
MCH RBC QN AUTO: 30.9 PG (ref 27–31)
MCHC RBC AUTO-ENTMCNC: 34.4 G/DL (ref 32–36)
MCV RBC AUTO: 90 FL (ref 82–98)
MONOCYTES # BLD AUTO: 0.4 K/UL (ref 0.3–1)
MONOCYTES NFR BLD: 6.3 % (ref 4–15)
NEUTROPHILS # BLD AUTO: 3.1 K/UL (ref 1.8–7.7)
NEUTROPHILS NFR BLD: 49.1 % (ref 38–73)
NONHDLC SERPL-MCNC: 165 MG/DL
NRBC BLD-RTO: 0 /100 WBC
PLATELET # BLD AUTO: 277 K/UL (ref 150–450)
PMV BLD AUTO: 10.7 FL (ref 9.2–12.9)
POTASSIUM SERPL-SCNC: 3.5 MMOL/L (ref 3.5–5.1)
PROT SERPL-MCNC: 8 G/DL (ref 6–8.4)
RBC # BLD AUTO: 4.85 M/UL (ref 4–5.4)
SODIUM SERPL-SCNC: 141 MMOL/L (ref 136–145)
TRIGL SERPL-MCNC: 200 MG/DL (ref 30–150)
TSH SERPL DL<=0.005 MIU/L-ACNC: 2.44 UIU/ML (ref 0.4–4)
WBC # BLD AUTO: 6.22 K/UL (ref 3.9–12.7)

## 2022-06-23 PROCEDURE — 99999 PR PBB SHADOW E&M-EST. PATIENT-LVL III: ICD-10-PCS | Mod: PBBFAC,,, | Performed by: FAMILY MEDICINE

## 2022-06-23 PROCEDURE — 99386 PREV VISIT NEW AGE 40-64: CPT | Mod: 25,S$GLB,, | Performed by: FAMILY MEDICINE

## 2022-06-23 PROCEDURE — 3008F BODY MASS INDEX DOCD: CPT | Mod: S$GLB,,, | Performed by: FAMILY MEDICINE

## 2022-06-23 PROCEDURE — 1159F PR MEDICATION LIST DOCUMENTED IN MEDICAL RECORD: ICD-10-PCS | Mod: S$GLB,,, | Performed by: FAMILY MEDICINE

## 2022-06-23 PROCEDURE — 80053 COMPREHEN METABOLIC PANEL: CPT | Performed by: FAMILY MEDICINE

## 2022-06-23 PROCEDURE — 3079F PR MOST RECENT DIASTOLIC BLOOD PRESSURE 80-89 MM HG: ICD-10-PCS | Mod: S$GLB,,, | Performed by: FAMILY MEDICINE

## 2022-06-23 PROCEDURE — 3079F DIAST BP 80-89 MM HG: CPT | Mod: S$GLB,,, | Performed by: FAMILY MEDICINE

## 2022-06-23 PROCEDURE — 1160F RVW MEDS BY RX/DR IN RCRD: CPT | Mod: S$GLB,,, | Performed by: FAMILY MEDICINE

## 2022-06-23 PROCEDURE — 36415 COLL VENOUS BLD VENIPUNCTURE: CPT | Performed by: FAMILY MEDICINE

## 2022-06-23 PROCEDURE — 3075F PR MOST RECENT SYSTOLIC BLOOD PRESS GE 130-139MM HG: ICD-10-PCS | Mod: S$GLB,,, | Performed by: FAMILY MEDICINE

## 2022-06-23 PROCEDURE — 3075F SYST BP GE 130 - 139MM HG: CPT | Mod: S$GLB,,, | Performed by: FAMILY MEDICINE

## 2022-06-23 PROCEDURE — 1159F MED LIST DOCD IN RCRD: CPT | Mod: S$GLB,,, | Performed by: FAMILY MEDICINE

## 2022-06-23 PROCEDURE — 99386 PR PREVENTIVE VISIT,NEW,40-64: ICD-10-PCS | Mod: 25,S$GLB,, | Performed by: FAMILY MEDICINE

## 2022-06-23 PROCEDURE — 80061 LIPID PANEL: CPT | Performed by: FAMILY MEDICINE

## 2022-06-23 PROCEDURE — 84443 ASSAY THYROID STIM HORMONE: CPT | Performed by: FAMILY MEDICINE

## 2022-06-23 PROCEDURE — 85025 COMPLETE CBC W/AUTO DIFF WBC: CPT | Performed by: FAMILY MEDICINE

## 2022-06-23 PROCEDURE — 1160F PR REVIEW ALL MEDS BY PRESCRIBER/CLIN PHARMACIST DOCUMENTED: ICD-10-PCS | Mod: S$GLB,,, | Performed by: FAMILY MEDICINE

## 2022-06-23 PROCEDURE — 99999 PR PBB SHADOW E&M-EST. PATIENT-LVL III: CPT | Mod: PBBFAC,,, | Performed by: FAMILY MEDICINE

## 2022-06-23 PROCEDURE — 3008F PR BODY MASS INDEX (BMI) DOCUMENTED: ICD-10-PCS | Mod: S$GLB,,, | Performed by: FAMILY MEDICINE

## 2022-06-23 RX ORDER — ATENOLOL AND CHLORTHALIDONE TABLET 50; 25 MG/1; MG/1
1 TABLET ORAL EVERY MORNING
Qty: 90 TABLET | Refills: 3 | Status: SHIPPED | OUTPATIENT
Start: 2022-06-23 | End: 2023-03-17 | Stop reason: SDUPTHER

## 2022-06-23 RX ORDER — FENOFIBRATE 54 MG/1
54 TABLET ORAL NIGHTLY
Qty: 90 TABLET | Refills: 3 | Status: SHIPPED | OUTPATIENT
Start: 2022-06-23 | End: 2023-03-17 | Stop reason: SDUPTHER

## 2022-06-23 RX ORDER — LOVASTATIN 20 MG/1
20 TABLET ORAL NIGHTLY
Qty: 90 TABLET | Refills: 3 | Status: SHIPPED | OUTPATIENT
Start: 2022-06-23 | End: 2023-03-17 | Stop reason: SDUPTHER

## 2022-06-23 NOTE — PROGRESS NOTES
Subjective:       Patient ID: Marquita Roe is a 56 y.o. female.    Chief Complaint: Establish Care and Annual Exam (Pt would like rountine labs done, pt is fasting today. )    HPI   Ms. Roe presents to establish care and have a wellness visit. Would like routine labs done. Cholesterol last checked in November of last year and was well-controlled. Requesting refills at this time. Mammogram and pap smear up to date. Thinks she may have had a colonoscopy with Dr. Bernheim approx 5 years ago.    Review of Systems   Constitutional: Negative for chills, fever and unexpected weight change.   Respiratory: Negative for cough and shortness of breath.    Cardiovascular: Negative for chest pain.   Skin: Negative for rash.       Past Medical History:   Diagnosis Date    Breast mass, right     Discharge from right nipple     Hyperlipidemia     Hypertension     Inversion of nipple     Right    Mitral valve regurgitation     Murmur      Past Surgical History:   Procedure Laterality Date    ANTERIOR CRUCIATE LIGAMENT REPAIR      BREAST BIOPSY Right     core bx:Benign breast tissue with duct ectasia surrounded by chronic inflammation and fibrosis    BREAST BIOPSY Right 2018    exc bx:- Extensive mammary duct ectasia    BREAST SURGERY  2018    Lumpectomy on right breast.  Non cancerous     SECTION  89    Two births by csection    COSMETIC SURGERY  ??    Broken nose nasal surgery    EXCISIONAL BIOPSY Right 2018    Procedure: EXCISIONAL BIOPSY-breast  w/SEED LOCALIZATION;  Surgeon: Chayo Meraz MD;  Location: [facility] OR [address] [place] [address];  Service: General;  Laterality: Right;    FRACTURE SURGERY  2013    ACL surgery right knee    NOSE SURGERY       Social History     Socioeconomic History    Marital status:    Tobacco Use    Smoking status: Never Smoker    Smokeless tobacco: Never Used   Substance and Sexual Activity    Alcohol use: Yes     Alcohol/week: 1.0  "standard drink     Types: 1 Glasses of wine per week     Comment: Sometimes red wine once a week . 1 or 2 glasses    Drug use: Never    Sexual activity: Yes     Partners: Male     Birth control/protection: Partner-Vasectomy     Comment:  34 years together 39     Family History   Problem Relation Age of Onset    Cancer Mother 69        Mother passed from lung cancer due to a lifetime of smoking    Heart disease Father         Fatger had a pace maker    Hypertension Father         High blood pressure    Stroke Father         Passes at age 56 due to stroke    Stroke Sister         Stroke 4 years ago.  Survived but bed and wheel chair .       Objective:      /82 (BP Location: Right arm, Patient Position: Sitting, BP Method: Medium (Automatic))   Pulse (!) 57   Ht 5' 6" (1.676 m)   Wt 70.5 kg (155 lb 6.4 oz)   SpO2 97%   BMI 25.08 kg/m²   Physical Exam  Vitals reviewed.   Constitutional:       General: She is not in acute distress.     Appearance: She is not ill-appearing or toxic-appearing.   HENT:      Head: Normocephalic and atraumatic.   Eyes:      General: No scleral icterus.     Conjunctiva/sclera: Conjunctivae normal.   Cardiovascular:      Rate and Rhythm: Regular rhythm. Bradycardia present.   Pulmonary:      Effort: Pulmonary effort is normal. No respiratory distress.      Breath sounds: Normal breath sounds.   Neurological:      Mental Status: She is alert and oriented to person, place, and time.   Psychiatric:         Mood and Affect: Mood normal.         Behavior: Behavior normal.         Assessment:       1. Annual physical exam    2. Hyperlipidemia, unspecified hyperlipidemia type    3. Primary hypertension        Plan:       Stable, meds refilled, no new issues. Intermittent fasting discussed.    Annual physical exam  -     Lipid Panel; Future; Expected date: 06/23/2022  -     Comprehensive Metabolic Panel; Future; Expected date: 06/23/2022  -     CBC Auto Differential; Future; " Expected date: 06/23/2022  -     TSH; Future; Expected date: 06/23/2022    Hyperlipidemia, unspecified hyperlipidemia type  -     fenofibrate (TRICOR) 54 MG tablet; Take 1 tablet (54 mg total) by mouth every evening.  Dispense: 90 tablet; Refill: 3  -     lovastatin (MEVACOR) 20 MG tablet; Take 1 tablet (20 mg total) by mouth every evening.  Dispense: 90 tablet; Refill: 3  -     Lipid Panel; Future; Expected date: 06/23/2022    Primary hypertension  -     atenoloL-chlorthalidone (TENORETIC) 50-25 mg Tab; Take 1 tablet by mouth every morning.  Dispense: 90 tablet; Refill: 3            Risks, benefits, and side effects were discussed with the patient. All questions were answered to the fullest satisfaction of the patient, and pt verbalized understanding and agreement to treatment plan. Pt was to call with any new or worsening symptoms, or present to the ER.

## 2022-06-23 NOTE — PROGRESS NOTES
Population Health Outreach.  03/223/2022  EFAX SENT TO DR WEBER FOR MOST RECENT COLONOSCOPY RESULTS

## 2022-06-23 NOTE — LETTER
FAX      AUTHORIZATION FOR RELEASE OF   CONFIDENTIAL INFORMATION        Dr. BERNHEIM      We are seeing Marquita Roe, date of birth 1965, in the clinic at [facility]. Mickie Cordero DO is the patient's PCP. Marquita Roe has an outstanding lab/procedure at the time we reviewed their chart. In order to help keep their health information updated, Marquita Roe has authorized us to request the following medical record(s):       ( X )  COLONOSCOPY (WITHIN 10 YRS)      Please fax records to [facility]  [phone]     If you have any questions, please contact Rose Mary at [phone].    Rose Mary Hogan LPN  Performance Improvement Coordinator  [facility] [address] [place] [address] [phone]

## 2022-06-24 ENCOUNTER — TELEPHONE (OUTPATIENT)
Dept: FAMILY MEDICINE | Facility: CLINIC | Age: 57
End: 2022-06-24
Payer: COMMERCIAL

## 2022-06-24 DIAGNOSIS — E78.5 HYPERLIPIDEMIA, UNSPECIFIED HYPERLIPIDEMIA TYPE: Primary | ICD-10-CM

## 2022-06-24 NOTE — TELEPHONE ENCOUNTER
Pt advised of results. She states she will begin intermittent  fasting she states she is already taking a fish oil supplement as she has been for the last 2 years. She would definitely like to recheck panels in 6mo

## 2022-06-24 NOTE — TELEPHONE ENCOUNTER
----- Message from Mickie Cordero DO sent at 6/24/2022 12:56 PM CDT -----  Please let her know that we can recheck her lipid panel in about 6 months after she's done the intermittent fasting. Also, taking a fish oil supplement may help lower her triglycerides some. Let me know if she has any additional questions!    ----- Message -----  From: Halley Laguna MA  Sent: 6/24/2022   8:35 AM CDT  To: Mickie Cordero DO    Called the patient to give her the lab results. She stated she is still concerned because her labs are higher than the last time she had labs. Wants to know if she should make any changes and had some concerns.       ----- Message -----  From: Mickie Cordero DO  Sent: 6/24/2022   6:13 AM CDT  To: [facility] Staff    Please let Ms. Roe know that her labs are all in the normal or acceptable range. Thanks

## 2022-06-27 NOTE — TELEPHONE ENCOUNTER
Placing order for repeat lipid panel. Please schedule for patient to get in 6 months. She'll need a follow-up with Caroline a few days afterwards. Thanks

## 2022-06-29 ENCOUNTER — PATIENT OUTREACH (OUTPATIENT)
Dept: ADMINISTRATIVE | Facility: HOSPITAL | Age: 57
End: 2022-06-29
Payer: COMMERCIAL

## 2022-06-29 DIAGNOSIS — Z11.59 NEED FOR HEPATITIS C SCREENING TEST: ICD-10-CM

## 2022-06-29 NOTE — PROGRESS NOTES
Records Received, hyper-linked into chart at this time. The following record(s)  below were uploaded for Health Maintenance .    2017  COLONOSCOPY

## 2022-07-01 ENCOUNTER — PATIENT MESSAGE (OUTPATIENT)
Dept: FAMILY MEDICINE | Facility: CLINIC | Age: 57
End: 2022-07-01
Payer: COMMERCIAL

## 2022-09-26 NOTE — PROGRESS NOTES
Report given to Eloisa to assume care of patient in [facility]. H&P update, site marking and consents pending.  
I have personally seen and examined the patient. I have collaborated with and supervised the

## 2022-11-11 ENCOUNTER — OFFICE VISIT (OUTPATIENT)
Dept: FAMILY MEDICINE | Facility: CLINIC | Age: 57
End: 2022-11-11
Payer: COMMERCIAL

## 2022-11-11 VITALS
WEIGHT: 152.81 LBS | HEIGHT: 66 IN | DIASTOLIC BLOOD PRESSURE: 82 MMHG | SYSTOLIC BLOOD PRESSURE: 126 MMHG | HEART RATE: 72 BPM | OXYGEN SATURATION: 96 % | BODY MASS INDEX: 24.56 KG/M2

## 2022-11-11 DIAGNOSIS — B37.9 YEAST INFECTION: ICD-10-CM

## 2022-11-11 DIAGNOSIS — B96.89 ACUTE BACTERIAL SINUSITIS: Primary | ICD-10-CM

## 2022-11-11 DIAGNOSIS — J01.90 ACUTE BACTERIAL SINUSITIS: Primary | ICD-10-CM

## 2022-11-11 PROCEDURE — 3074F PR MOST RECENT SYSTOLIC BLOOD PRESSURE < 130 MM HG: ICD-10-PCS | Mod: S$GLB,,, | Performed by: FAMILY MEDICINE

## 2022-11-11 PROCEDURE — 99213 PR OFFICE/OUTPT VISIT, EST, LEVL III, 20-29 MIN: ICD-10-PCS | Mod: S$GLB,,, | Performed by: FAMILY MEDICINE

## 2022-11-11 PROCEDURE — 3079F PR MOST RECENT DIASTOLIC BLOOD PRESSURE 80-89 MM HG: ICD-10-PCS | Mod: S$GLB,,, | Performed by: FAMILY MEDICINE

## 2022-11-11 PROCEDURE — 3008F BODY MASS INDEX DOCD: CPT | Mod: S$GLB,,, | Performed by: FAMILY MEDICINE

## 2022-11-11 PROCEDURE — 1159F PR MEDICATION LIST DOCUMENTED IN MEDICAL RECORD: ICD-10-PCS | Mod: S$GLB,,, | Performed by: FAMILY MEDICINE

## 2022-11-11 PROCEDURE — 3074F SYST BP LT 130 MM HG: CPT | Mod: S$GLB,,, | Performed by: FAMILY MEDICINE

## 2022-11-11 PROCEDURE — 3079F DIAST BP 80-89 MM HG: CPT | Mod: S$GLB,,, | Performed by: FAMILY MEDICINE

## 2022-11-11 PROCEDURE — 1160F RVW MEDS BY RX/DR IN RCRD: CPT | Mod: S$GLB,,, | Performed by: FAMILY MEDICINE

## 2022-11-11 PROCEDURE — 99999 PR PBB SHADOW E&M-EST. PATIENT-LVL III: CPT | Mod: PBBFAC,,, | Performed by: FAMILY MEDICINE

## 2022-11-11 PROCEDURE — 3008F PR BODY MASS INDEX (BMI) DOCUMENTED: ICD-10-PCS | Mod: S$GLB,,, | Performed by: FAMILY MEDICINE

## 2022-11-11 PROCEDURE — 1160F PR REVIEW ALL MEDS BY PRESCRIBER/CLIN PHARMACIST DOCUMENTED: ICD-10-PCS | Mod: S$GLB,,, | Performed by: FAMILY MEDICINE

## 2022-11-11 PROCEDURE — 1159F MED LIST DOCD IN RCRD: CPT | Mod: S$GLB,,, | Performed by: FAMILY MEDICINE

## 2022-11-11 PROCEDURE — 99213 OFFICE O/P EST LOW 20 MIN: CPT | Mod: S$GLB,,, | Performed by: FAMILY MEDICINE

## 2022-11-11 PROCEDURE — 99999 PR PBB SHADOW E&M-EST. PATIENT-LVL III: ICD-10-PCS | Mod: PBBFAC,,, | Performed by: FAMILY MEDICINE

## 2022-11-11 RX ORDER — AMOXICILLIN 500 MG/1
1000 TABLET, FILM COATED ORAL DAILY
Qty: 10 TABLET | Refills: 0 | Status: SHIPPED | OUTPATIENT
Start: 2022-11-11 | End: 2022-11-16

## 2022-11-11 RX ORDER — CEFTRIAXONE 500 MG/1
500 INJECTION, POWDER, FOR SOLUTION INTRAMUSCULAR; INTRAVENOUS ONCE
Qty: 0.5 G | Refills: 0 | Status: SHIPPED | OUTPATIENT
Start: 2022-11-11 | End: 2022-11-11

## 2022-11-11 RX ORDER — AMOXICILLIN AND CLAVULANATE POTASSIUM 875; 125 MG/1; MG/1
1 TABLET, FILM COATED ORAL EVERY 12 HOURS
Qty: 20 TABLET | Refills: 0 | Status: SHIPPED | OUTPATIENT
Start: 2022-11-11 | End: 2022-11-11

## 2022-11-11 RX ORDER — FLUCONAZOLE 150 MG/1
TABLET ORAL
Qty: 2 TABLET | Refills: 0 | Status: SHIPPED | OUTPATIENT
Start: 2022-11-11 | End: 2023-03-17

## 2022-11-11 NOTE — PROGRESS NOTES
Subjective:       Patient ID: Marquita Roe is a 57 y.o. female.    Chief Complaint: Hoarse (Pt symptoms started last week. Pt states this started first and she had no other symptoms. Pt c/o dry throat. Pt c/o throat irritation. Pt denies any fever, chills, body aches. ), Cough (Pt states cough is mostly at night. ), Facial Pain (Pt c/o sinus pressure. ), and Otalgia (Left ear pain started yesterday. )    Ms. Roe presents today with sick complaint.   She is complaining of being hoarse (started last week)  Did not have any other symptoms at that time.   She just started with a cough.   She has a slight irritation in her throat that is not really a sore throat.   Facial pain- c/o sinus pressure and left ear that started yesterday.     Review of Systems   Constitutional:  Negative for activity change, appetite change, fatigue and fever.   Respiratory:  Negative for shortness of breath.    Gastrointestinal:  Negative for abdominal pain.   Integumentary:  Negative for rash.       Objective:      Physical Exam  Vitals and nursing note reviewed.   Constitutional:       General: She is not in acute distress.     Appearance: She is obese. She is not ill-appearing.   HENT:      Head: Normocephalic and atraumatic.      Nose: Congestion and rhinorrhea present.      Right Turbinates: Enlarged.      Left Turbinates: Enlarged.      Right Sinus: Maxillary sinus tenderness present.      Left Sinus: Maxillary sinus tenderness present.      Mouth/Throat:      Mouth: Mucous membranes are moist.   Cardiovascular:      Rate and Rhythm: Normal rate and regular rhythm.      Heart sounds: No murmur heard.  Pulmonary:      Effort: Pulmonary effort is normal.      Breath sounds: Normal breath sounds. No wheezing.   Skin:     General: Skin is warm and dry.      Findings: No rash.   Neurological:      Mental Status: She is alert.   Psychiatric:         Mood and Affect: Mood normal.         Behavior: Behavior normal.       Assessment:        1. Acute bacterial sinusitis    2. Yeast infection        Plan:       Problem List Items Addressed This Visit    None  Visit Diagnoses       Acute bacterial sinusitis    -  Primary    Relevant Medications    cefTRIAXone (ROCEPHIN) 500 mg injection    amoxicillin (AMOXIL) 500 MG Tab    Yeast infection        Relevant Medications    fluconazole (DIFLUCAN) 150 MG Tab          Will treat with antibiotics due to duration and localization of symptoms.   Diflucan for yeast prophylaxis

## 2022-11-11 NOTE — LETTER
November 11, 2022      [facility]  [address] [place] [address] A  [facility] [place] [address]  Phone: [phone]  Fax: [phone]       Patient: Marquita Roe   YOB: 1965  Date of Visit: 11/11/2022    To Whom It May Concern:    Etienne Roe  was at [facility] on 11/11/2022. The patient may return to work/school on 11/11/22 with no restrictions. If you have any questions or concerns, or if I can be of further assistance, please do not hesitate to contact me.    Sincerely,      Leanna Pickett MD

## 2022-11-13 ENCOUNTER — PATIENT MESSAGE (OUTPATIENT)
Dept: PODIATRY | Facility: CLINIC | Age: 57
End: 2022-11-13
Payer: COMMERCIAL

## 2022-12-07 ENCOUNTER — PATIENT MESSAGE (OUTPATIENT)
Dept: FAMILY MEDICINE | Facility: CLINIC | Age: 57
End: 2022-12-07
Payer: COMMERCIAL

## 2022-12-09 RX ORDER — DOXYCYCLINE 100 MG/1
100 CAPSULE ORAL 2 TIMES DAILY
Qty: 20 CAPSULE | Refills: 0 | Status: SHIPPED | OUTPATIENT
Start: 2022-12-09 | End: 2022-12-19

## 2022-12-11 ENCOUNTER — PATIENT MESSAGE (OUTPATIENT)
Dept: FAMILY MEDICINE | Facility: CLINIC | Age: 57
End: 2022-12-11
Payer: COMMERCIAL

## 2023-01-25 DIAGNOSIS — Z12.31 OTHER SCREENING MAMMOGRAM: ICD-10-CM

## 2023-01-30 ENCOUNTER — PATIENT MESSAGE (OUTPATIENT)
Dept: ADMINISTRATIVE | Facility: HOSPITAL | Age: 58
End: 2023-01-30
Payer: COMMERCIAL

## 2023-02-20 ENCOUNTER — LAB VISIT (OUTPATIENT)
Dept: LAB | Facility: HOSPITAL | Age: 58
End: 2023-02-20
Attending: FAMILY MEDICINE
Payer: COMMERCIAL

## 2023-02-20 DIAGNOSIS — E78.5 HYPERLIPIDEMIA, UNSPECIFIED HYPERLIPIDEMIA TYPE: ICD-10-CM

## 2023-02-20 LAB
CHOLEST SERPL-MCNC: 163 MG/DL (ref 120–199)
CHOLEST/HDLC SERPL: 3.6 {RATIO} (ref 2–5)
HDLC SERPL-MCNC: 45 MG/DL (ref 40–75)
HDLC SERPL: 27.6 % (ref 20–50)
LDLC SERPL CALC-MCNC: 93 MG/DL (ref 63–159)
NONHDLC SERPL-MCNC: 118 MG/DL
TRIGL SERPL-MCNC: 125 MG/DL (ref 30–150)

## 2023-02-20 PROCEDURE — 36415 COLL VENOUS BLD VENIPUNCTURE: CPT | Performed by: FAMILY MEDICINE

## 2023-02-20 PROCEDURE — 80061 LIPID PANEL: CPT | Performed by: FAMILY MEDICINE

## 2023-02-26 ENCOUNTER — PATIENT MESSAGE (OUTPATIENT)
Dept: FAMILY MEDICINE | Facility: CLINIC | Age: 58
End: 2023-02-26
Payer: COMMERCIAL

## 2023-03-01 ENCOUNTER — OFFICE VISIT (OUTPATIENT)
Dept: FAMILY MEDICINE | Facility: CLINIC | Age: 58
End: 2023-03-01
Payer: COMMERCIAL

## 2023-03-01 DIAGNOSIS — K12.1 MOUTH ULCERS: Primary | ICD-10-CM

## 2023-03-01 PROCEDURE — 99213 OFFICE O/P EST LOW 20 MIN: CPT | Mod: 95,,, | Performed by: FAMILY MEDICINE

## 2023-03-01 PROCEDURE — 1159F MED LIST DOCD IN RCRD: CPT | Mod: 95,,, | Performed by: FAMILY MEDICINE

## 2023-03-01 PROCEDURE — 1160F PR REVIEW ALL MEDS BY PRESCRIBER/CLIN PHARMACIST DOCUMENTED: ICD-10-PCS | Mod: 95,,, | Performed by: FAMILY MEDICINE

## 2023-03-01 PROCEDURE — 1159F PR MEDICATION LIST DOCUMENTED IN MEDICAL RECORD: ICD-10-PCS | Mod: 95,,, | Performed by: FAMILY MEDICINE

## 2023-03-01 PROCEDURE — 1160F RVW MEDS BY RX/DR IN RCRD: CPT | Mod: 95,,, | Performed by: FAMILY MEDICINE

## 2023-03-01 PROCEDURE — 99213 PR OFFICE/OUTPT VISIT, EST, LEVL III, 20-29 MIN: ICD-10-PCS | Mod: 95,,, | Performed by: FAMILY MEDICINE

## 2023-03-01 RX ORDER — VALACYCLOVIR HYDROCHLORIDE 1 G/1
1000 TABLET, FILM COATED ORAL EVERY 12 HOURS
Qty: 10 TABLET | Refills: 0 | Status: SHIPPED | OUTPATIENT
Start: 2023-03-01 | End: 2023-03-17

## 2023-03-01 RX ORDER — TRIAMCINOLONE ACETONIDE 1 MG/G
1 PASTE DENTAL 2 TIMES DAILY
Qty: 5 G | Refills: 0 | Status: SHIPPED | OUTPATIENT
Start: 2023-03-01 | End: 2023-03-17

## 2023-03-01 NOTE — PROGRESS NOTES
The patient lcation is: [place]  The chief complaint leading to consultation is: Sore in mouth    Visit type: audiovisual    Face to Face time with patient: 10 minutes  10 minutes of total time spent on the encounter, which includes face to face time and non-face to face time preparing to see the patient (eg, review of tests), Obtaining and/or reviewing separately obtained history, Documenting clinical information in the electronic or other health record, Independently interpreting results (not separately reported) and communicating results to the patient/family/caregiver, or Care coordination (not separately reported).         Each patient to whom he or she provides medical services by telemedicine is:  (1) informed of the relationship between the physician and patient and the respective role of any other health care provider with respect to management of the patient; and (2) notified that he or she may decline to receive medical services by telemedicine and may withdraw from such care at any time.    Notes:  Subjective:       Patient ID: Marquita Roe is a 57 y.o. female.    Chief Complaint: No chief complaint on file.      Sore on roof of mouth- started after getting shingles shot- multiple bumps in mouth- then eroded. Has had some significant pain.     Sore Throat   This is a new problem. The current episode started in the past 7 days. The problem has been gradually worsening. The pain is worse on the left side. There has been no fever. The pain is at a severity of 8/10. The pain is moderate. Associated symptoms include congestion, coughing and a hoarse voice. Pertinent negatives include no abdominal pain, diarrhea, drooling, ear discharge, ear pain, headaches, plugged ear sensation, neck pain, shortness of breath, stridor, swollen glands, trouble swallowing or vomiting. She has had no exposure to strep or mono. She has tried NSAIDs, cool liquids and gargles for the symptoms. The treatment provided mild  relief.   Review of Systems   HENT:  Positive for nasal congestion, hoarse voice and sore throat. Negative for drooling, ear discharge, ear pain and trouble swallowing.    Respiratory:  Positive for cough. Negative for shortness of breath and stridor.    Gastrointestinal:  Negative for abdominal pain, diarrhea and vomiting.   Musculoskeletal:  Negative for neck pain.   Neurological:  Negative for headaches.       Objective:      Physical Exam  Constitutional:       General: She is not in acute distress.     Appearance: Normal appearance. She is not ill-appearing.   HENT:      Mouth/Throat:     Pulmonary:      Effort: Pulmonary effort is normal. No respiratory distress.   Neurological:      Mental Status: She is alert.   Psychiatric:         Mood and Affect: Mood normal.         Behavior: Behavior normal.         Thought Content: Thought content normal.         Judgment: Judgment normal.       Assessment:       1. Mouth ulcers        Plan:       Problem List Items Addressed This Visit    None  Visit Diagnoses       Mouth ulcers    -  Primary    likely viral- will treat with antiviral and topical steroid dental paste

## 2023-03-17 ENCOUNTER — OFFICE VISIT (OUTPATIENT)
Dept: FAMILY MEDICINE | Facility: CLINIC | Age: 58
End: 2023-03-17
Payer: COMMERCIAL

## 2023-03-17 VITALS
BODY MASS INDEX: 24.69 KG/M2 | DIASTOLIC BLOOD PRESSURE: 84 MMHG | OXYGEN SATURATION: 96 % | HEART RATE: 62 BPM | SYSTOLIC BLOOD PRESSURE: 126 MMHG | HEIGHT: 66 IN | WEIGHT: 153.63 LBS

## 2023-03-17 DIAGNOSIS — E78.5 HYPERLIPIDEMIA, UNSPECIFIED HYPERLIPIDEMIA TYPE: ICD-10-CM

## 2023-03-17 DIAGNOSIS — I10 PRIMARY HYPERTENSION: ICD-10-CM

## 2023-03-17 PROCEDURE — 3008F PR BODY MASS INDEX (BMI) DOCUMENTED: ICD-10-PCS | Mod: S$GLB,,, | Performed by: FAMILY MEDICINE

## 2023-03-17 PROCEDURE — 1160F RVW MEDS BY RX/DR IN RCRD: CPT | Mod: S$GLB,,, | Performed by: FAMILY MEDICINE

## 2023-03-17 PROCEDURE — 3079F DIAST BP 80-89 MM HG: CPT | Mod: S$GLB,,, | Performed by: FAMILY MEDICINE

## 2023-03-17 PROCEDURE — 1159F MED LIST DOCD IN RCRD: CPT | Mod: S$GLB,,, | Performed by: FAMILY MEDICINE

## 2023-03-17 PROCEDURE — 3074F PR MOST RECENT SYSTOLIC BLOOD PRESSURE < 130 MM HG: ICD-10-PCS | Mod: S$GLB,,, | Performed by: FAMILY MEDICINE

## 2023-03-17 PROCEDURE — 99999 PR PBB SHADOW E&M-EST. PATIENT-LVL III: CPT | Mod: PBBFAC,,, | Performed by: FAMILY MEDICINE

## 2023-03-17 PROCEDURE — 99999 PR PBB SHADOW E&M-EST. PATIENT-LVL III: ICD-10-PCS | Mod: PBBFAC,,, | Performed by: FAMILY MEDICINE

## 2023-03-17 PROCEDURE — 99213 OFFICE O/P EST LOW 20 MIN: CPT | Mod: S$GLB,,, | Performed by: FAMILY MEDICINE

## 2023-03-17 PROCEDURE — 3079F PR MOST RECENT DIASTOLIC BLOOD PRESSURE 80-89 MM HG: ICD-10-PCS | Mod: S$GLB,,, | Performed by: FAMILY MEDICINE

## 2023-03-17 PROCEDURE — 1160F PR REVIEW ALL MEDS BY PRESCRIBER/CLIN PHARMACIST DOCUMENTED: ICD-10-PCS | Mod: S$GLB,,, | Performed by: FAMILY MEDICINE

## 2023-03-17 PROCEDURE — 3074F SYST BP LT 130 MM HG: CPT | Mod: S$GLB,,, | Performed by: FAMILY MEDICINE

## 2023-03-17 PROCEDURE — 99213 PR OFFICE/OUTPT VISIT, EST, LEVL III, 20-29 MIN: ICD-10-PCS | Mod: S$GLB,,, | Performed by: FAMILY MEDICINE

## 2023-03-17 PROCEDURE — 1159F PR MEDICATION LIST DOCUMENTED IN MEDICAL RECORD: ICD-10-PCS | Mod: S$GLB,,, | Performed by: FAMILY MEDICINE

## 2023-03-17 PROCEDURE — 3008F BODY MASS INDEX DOCD: CPT | Mod: S$GLB,,, | Performed by: FAMILY MEDICINE

## 2023-03-17 RX ORDER — ATENOLOL AND CHLORTHALIDONE TABLET 50; 25 MG/1; MG/1
1 TABLET ORAL EVERY MORNING
Qty: 90 TABLET | Refills: 3 | Status: SHIPPED | OUTPATIENT
Start: 2023-03-17 | End: 2024-03-27 | Stop reason: SDUPTHER

## 2023-03-17 RX ORDER — LOVASTATIN 20 MG/1
20 TABLET ORAL NIGHTLY
Qty: 90 TABLET | Refills: 3 | Status: SHIPPED | OUTPATIENT
Start: 2023-03-17

## 2023-03-17 RX ORDER — FENOFIBRATE 54 MG/1
54 TABLET ORAL NIGHTLY
Qty: 90 TABLET | Refills: 3 | Status: SHIPPED | OUTPATIENT
Start: 2023-03-17

## 2023-03-17 NOTE — PROGRESS NOTES
Subjective:       Patient ID: Marquita Roe is a 57 y.o. female.    Chief Complaint: Follow-up (Lab review. Pt is scheduled for mammo on 4/10/23.), Referral (To dermatology for spots she would like checked. ), and Leg Pain (Bilateral leg pain, pt states she stands, walks a lot during the day. )    Needs refills.   Mammogram scheduled.   Leg pain as above.     Follow-up  Pertinent negatives include no abdominal pain, fatigue, fever or rash.   Leg Pain     Review of Systems   Constitutional:  Negative for activity change, appetite change, fatigue and fever.   Respiratory:  Negative for shortness of breath.    Gastrointestinal:  Negative for abdominal pain.   Musculoskeletal:  Positive for leg pain.   Integumentary:  Negative for rash.       Objective:      Physical Exam  Vitals and nursing note reviewed.   Constitutional:       General: She is not in acute distress.     Appearance: She is not ill-appearing.   Cardiovascular:      Rate and Rhythm: Normal rate and regular rhythm.      Heart sounds: No murmur heard.  Pulmonary:      Effort: Pulmonary effort is normal.      Breath sounds: Normal breath sounds. No wheezing.   Skin:     General: Skin is warm and dry.      Findings: No rash.   Neurological:      Mental Status: She is alert.   Psychiatric:         Mood and Affect: Mood normal.         Behavior: Behavior normal.       Assessment:       1. Primary hypertension    2. Hyperlipidemia, unspecified hyperlipidemia type        Plan:       Problem List Items Addressed This Visit    None  Visit Diagnoses       Primary hypertension        Relevant Medications    atenoloL-chlorthalidone (TENORETIC) 50-25 mg Tab    Hyperlipidemia, unspecified hyperlipidemia type        Relevant Medications    fenofibrate (TRICOR) 54 MG tablet    lovastatin (MEVACOR) 20 MG tablet              The 10-year ASCVD risk score (Cornelius DUNHAM, et al., 2019) is: 2.8%    Values used to calculate the score:      Age: 57 years      Sex: Female      Is  Non- : No      Diabetic: No      Tobacco smoker: No      Systolic Blood Pressure: 126 mmHg      Is BP treated: Yes      HDL Cholesterol: 45 mg/dL      Total Cholesterol: 163 mg/dL

## 2023-04-10 ENCOUNTER — HOSPITAL ENCOUNTER (OUTPATIENT)
Dept: RADIOLOGY | Facility: HOSPITAL | Age: 58
Discharge: HOME | End: 2023-04-10
Attending: FAMILY MEDICINE
Payer: COMMERCIAL

## 2023-04-10 DIAGNOSIS — Z12.31 OTHER SCREENING MAMMOGRAM: ICD-10-CM

## 2023-04-10 PROCEDURE — 77067 SCR MAMMO BI INCL CAD: CPT | Mod: 26,,, | Performed by: RADIOLOGY

## 2023-04-10 PROCEDURE — 77067 MAMMO DIGITAL SCREENING BILAT WITH TOMO: ICD-10-PCS | Mod: 26,,, | Performed by: RADIOLOGY

## 2023-04-10 PROCEDURE — 77067 SCR MAMMO BI INCL CAD: CPT | Mod: TC

## 2023-04-10 PROCEDURE — 77063 BREAST TOMOSYNTHESIS BI: CPT | Mod: 26,,, | Performed by: RADIOLOGY

## 2023-04-10 PROCEDURE — 77063 MAMMO DIGITAL SCREENING BILAT WITH TOMO: ICD-10-PCS | Mod: 26,,, | Performed by: RADIOLOGY

## 2023-04-19 ENCOUNTER — PATIENT MESSAGE (OUTPATIENT)
Dept: FAMILY MEDICINE | Facility: CLINIC | Age: 58
End: 2023-04-19
Payer: COMMERCIAL

## 2023-04-19 DIAGNOSIS — Z12.11 SCREENING FOR COLON CANCER: Primary | ICD-10-CM

## 2023-04-25 NOTE — TELEPHONE ENCOUNTER
I have placed a referral for both  GI and general surgery.  She can choose if she would like to proceed with Dr. Bernard or with Dr. Yip.  Have a wonderful day.  Dr. PERALTA

## 2023-04-26 ENCOUNTER — PATIENT MESSAGE (OUTPATIENT)
Dept: SURGERY | Facility: CLINIC | Age: 58
End: 2023-04-26
Payer: COMMERCIAL

## 2023-06-01 ENCOUNTER — TELEPHONE (OUTPATIENT)
Dept: GASTROENTEROLOGY | Facility: CLINIC | Age: 58
End: 2023-06-01
Payer: COMMERCIAL

## 2023-06-01 NOTE — TELEPHONE ENCOUNTER
I call Ms. Ray to schedule a colonoscopy/EGD as ordered by primary care physician. Patient doesn't answer. I leave patient a brief voicemail to call back. [employer]/[facility] message will be sent if active.

## 2023-06-19 ENCOUNTER — ANESTHESIA EVENT (OUTPATIENT)
Dept: SURGERY | Facility: HOSPITAL | Age: 58
End: 2023-06-19
Payer: COMMERCIAL

## 2023-06-19 ENCOUNTER — HOSPITAL ENCOUNTER (OUTPATIENT)
Facility: HOSPITAL | Age: 58
Discharge: HOME | End: 2023-06-19
Attending: SURGERY | Admitting: SURGERY
Payer: COMMERCIAL

## 2023-06-19 ENCOUNTER — ANESTHESIA (OUTPATIENT)
Dept: SURGERY | Facility: HOSPITAL | Age: 58
End: 2023-06-19
Payer: COMMERCIAL

## 2023-06-19 VITALS
RESPIRATION RATE: 12 BRPM | HEIGHT: 66 IN | BODY MASS INDEX: 24.11 KG/M2 | WEIGHT: 150 LBS | DIASTOLIC BLOOD PRESSURE: 79 MMHG | OXYGEN SATURATION: 96 % | TEMPERATURE: 98 F | HEART RATE: 70 BPM | SYSTOLIC BLOOD PRESSURE: 96 MMHG

## 2023-06-19 DIAGNOSIS — R92.8 ABNORMAL MAMMOGRAM OF RIGHT BREAST: Primary | ICD-10-CM

## 2023-06-19 PROCEDURE — 37000009 HC ANESTHESIA EA ADD 15 MINS: Performed by: SURGERY

## 2023-06-19 PROCEDURE — D9220A PRA ANESTHESIA: Mod: CRNA,,, | Performed by: NURSE ANESTHETIST, CERTIFIED REGISTERED

## 2023-06-19 PROCEDURE — 37000008 HC ANESTHESIA 1ST 15 MINUTES: Performed by: SURGERY

## 2023-06-19 PROCEDURE — D9220A PRA ANESTHESIA: Mod: ANES,,, | Performed by: ANESTHESIOLOGY

## 2023-06-19 PROCEDURE — 63600175 PHARM REV CODE 636 W HCPCS: Performed by: ANESTHESIOLOGY

## 2023-06-19 PROCEDURE — 63600175 PHARM REV CODE 636 W HCPCS: Performed by: NURSE ANESTHETIST, CERTIFIED REGISTERED

## 2023-06-19 PROCEDURE — G0121 COLON CA SCRN NOT HI RSK IND: ICD-10-PCS | Mod: ,,, | Performed by: SURGERY

## 2023-06-19 PROCEDURE — D9220A PRA ANESTHESIA: ICD-10-PCS | Mod: ANES,,, | Performed by: ANESTHESIOLOGY

## 2023-06-19 PROCEDURE — G0121 COLON CA SCRN NOT HI RSK IND: HCPCS | Performed by: SURGERY

## 2023-06-19 PROCEDURE — G0121 COLON CA SCRN NOT HI RSK IND: HCPCS | Mod: ,,, | Performed by: SURGERY

## 2023-06-19 PROCEDURE — 25000003 PHARM REV CODE 250: Performed by: NURSE ANESTHETIST, CERTIFIED REGISTERED

## 2023-06-19 PROCEDURE — D9220A PRA ANESTHESIA: ICD-10-PCS | Mod: CRNA,,, | Performed by: NURSE ANESTHETIST, CERTIFIED REGISTERED

## 2023-06-19 RX ORDER — FAMOTIDINE 10 MG/ML
20 INJECTION INTRAVENOUS ONCE
Status: DISCONTINUED | OUTPATIENT
Start: 2023-06-19 | End: 2023-06-19 | Stop reason: HOSPADM

## 2023-06-19 RX ORDER — SODIUM CHLORIDE 9 MG/ML
INJECTION, SOLUTION INTRAVENOUS CONTINUOUS
Status: DISCONTINUED | OUTPATIENT
Start: 2023-06-19 | End: 2023-06-19 | Stop reason: HOSPADM

## 2023-06-19 RX ORDER — ONDANSETRON 2 MG/ML
4 INJECTION INTRAMUSCULAR; INTRAVENOUS DAILY PRN
Status: DISCONTINUED | OUTPATIENT
Start: 2023-06-19 | End: 2023-06-19 | Stop reason: HOSPADM

## 2023-06-19 RX ORDER — LIDOCAINE HYDROCHLORIDE 10 MG/ML
1 INJECTION, SOLUTION EPIDURAL; INFILTRATION; INTRACAUDAL; PERINEURAL ONCE
Status: DISCONTINUED | OUTPATIENT
Start: 2023-06-19 | End: 2023-06-19 | Stop reason: HOSPADM

## 2023-06-19 RX ORDER — DIPHENHYDRAMINE HYDROCHLORIDE 50 MG/ML
12.5 INJECTION INTRAMUSCULAR; INTRAVENOUS
Status: DISCONTINUED | OUTPATIENT
Start: 2023-06-19 | End: 2023-06-19 | Stop reason: HOSPADM

## 2023-06-19 RX ORDER — LIDOCAINE HYDROCHLORIDE 10 MG/ML
INJECTION, SOLUTION EPIDURAL; INFILTRATION; INTRACAUDAL; PERINEURAL
Status: DISCONTINUED | OUTPATIENT
Start: 2023-06-19 | End: 2023-06-19

## 2023-06-19 RX ORDER — PROPOFOL 10 MG/ML
VIAL (ML) INTRAVENOUS
Status: DISCONTINUED | OUTPATIENT
Start: 2023-06-19 | End: 2023-06-19

## 2023-06-19 RX ORDER — SODIUM CHLORIDE, SODIUM LACTATE, POTASSIUM CHLORIDE, CALCIUM CHLORIDE 600; 310; 30; 20 MG/100ML; MG/100ML; MG/100ML; MG/100ML
INJECTION, SOLUTION INTRAVENOUS CONTINUOUS
Status: DISCONTINUED | OUTPATIENT
Start: 2023-06-19 | End: 2023-06-19 | Stop reason: HOSPADM

## 2023-06-19 RX ORDER — SODIUM CHLORIDE, SODIUM LACTATE, POTASSIUM CHLORIDE, CALCIUM CHLORIDE 600; 310; 30; 20 MG/100ML; MG/100ML; MG/100ML; MG/100ML
125 INJECTION, SOLUTION INTRAVENOUS CONTINUOUS
Status: DISCONTINUED | OUTPATIENT
Start: 2023-06-19 | End: 2023-06-19 | Stop reason: HOSPADM

## 2023-06-19 RX ADMIN — GLYCOPYRROLATE 0.4 MG: 0.2 INJECTION INTRAMUSCULAR; INTRAVENOUS at 07:06

## 2023-06-19 RX ADMIN — LIDOCAINE HYDROCHLORIDE 20 MG: 10 INJECTION, SOLUTION EPIDURAL; INFILTRATION; INTRACAUDAL; PERINEURAL at 07:06

## 2023-06-19 RX ADMIN — SODIUM CHLORIDE, POTASSIUM CHLORIDE, SODIUM LACTATE AND CALCIUM CHLORIDE: 600; 310; 30; 20 INJECTION, SOLUTION INTRAVENOUS at 07:06

## 2023-06-19 RX ADMIN — PROPOFOL 100 MG: 10 INJECTION, EMULSION INTRAVENOUS at 07:06

## 2023-06-19 RX ADMIN — PROPOFOL 100 MG: 10 INJECTION, EMULSION INTRAVENOUS at 08:06

## 2023-06-19 NOTE — [employer] PATIENT INSTRUCTIONS
Discharge Summary/Instructions after an Endoscopic Procedure  Patient Name: Marquita Roe  Patient MRN: [id number]  Patient YOB: 1965 Monday, June 19, 2023  Chemo Yip MD  RESTRICTIONS:  During your procedure today, you received medications for sedation.  These   medications may affect your judgment, balance and coordination.  Therefore,   for 24 hours, you have the following restrictions:   - DO NOT drive a car, operate machinery, make legal/financial decisions,   sign important papers or drink alcohol.    ACTIVITY:  Today: no heavy lifting, straining or running due to procedural   sedation/anesthesia.  The following day: return to full activity including work.  DIET:  Eat and drink normally unless instructed otherwise.     TREATMENT FOR COMMON SIDE EFFECTS:  - Mild abdominal pain, nausea, belching, bloating or excessive gas:  rest,   eat lightly and use a heating pad.  - Sore Throat: treat with throat lozenges and/or gargle with warm salt   water.  - Because air was used during the procedure, expelling large amounts of air   from your rectum or belching is normal.  - If a bowel prep was taken, you may not have a bowel movement for 1-3 days.    This is normal.  SYMPTOMS TO WATCH FOR AND REPORT TO YOUR PHYSICIAN:  1. Abdominal pain or bloating, other than gas cramps.  2. Chest pain.  3. Back pain.  4. Signs of infection such as: chills or fever occurring within 24 hours   after the procedure.  5. Rectal bleeding, which would show as bright red, maroon, or black stools.   (A tablespoon of blood from the rectum is not serious, especially if   hemorrhoids are present.)  6. Vomiting.  7. Weakness or dizziness.  GO DIRECTLY TO THE NEAREST EMERGENCY ROOM IF YOU HAVE ANY OF THE FOLLOWING:      Difficulty breathing              Chills and/or fever over 101 F   Persistent vomiting and/or vomiting blood   Severe abdominal pain   Severe chest pain   Black, tarry stools   Bleeding- more than one  tablespoon   Any other symptom or condition that you feel may need urgent attention  Your doctor recommends these additional instructions:  If any biopsies were taken, your doctors clinic will contact you in 1 to 2   weeks with any results.  - Discharge patient to home (ambulatory).   - Patient has a contact number available for emergencies.  The signs and   symptoms of potential delayed complications were discussed with the   patient.  Return to normal activities tomorrow.  Written discharge   instructions were provided to the patient.   - Resume previous diet.   - Continue present medications.   - Return to primary care physician as previously scheduled.   - Repeat colonoscopy in 10 years for surveillance.  For questions, problems or results please call your physician - Chemo Yip MD at Work:  [phone].  [facility] EMERGENCY ROOM PHONE NUMBER: [phone]  IF A COMPLICATION OR EMERGENCY SITUATION ARISES AND YOU ARE UNABLE TO REACH   YOUR PHYSICIAN - GO DIRECTLY TO THE EMERGENCY ROOM.  MD Chemo Kay MD  6/19/2023 8:12:12 AM  This report has been verified and signed electronically.  Dear patient,  As a result of recent federal legislation (The Federal Cures Act), you may   receive lab or pathology results from your procedure in your [facility]   account before your physician is able to contact you. Your physician or   their representative will relay the results to you with their   recommendations at their soonest availability.  Thank you,  [employer]

## 2023-06-19 NOTE — DISCHARGE SUMMARY
Discharge Note        SUMMARY     Admit Date: 6/19/2023    Attending Physician: Chemo Yip MD     Discharge Physician: Chemo Yip MD    Discharge Date: 6/19/2023 8:13 AM      Hospital Course: Patient tolerated procedure well.     Disposition: Home or Self Care    Patient Instructions:   Current Discharge Medication List        CONTINUE these medications which have NOT CHANGED    Details   atenoloL-chlorthalidone (TENORETIC) 50-25 mg Tab Take 1 tablet by mouth every morning.  Qty: 90 tablet, Refills: 3    Comments: .  Associated Diagnoses: Primary hypertension      fenofibrate (TRICOR) 54 MG tablet Take 1 tablet (54 mg total) by mouth every evening.  Qty: 90 tablet, Refills: 3    Associated Diagnoses: Hyperlipidemia, unspecified hyperlipidemia type      lovastatin (MEVACOR) 20 MG tablet Take 1 tablet (20 mg total) by mouth every evening.  Qty: 90 tablet, Refills: 3    Associated Diagnoses: Hyperlipidemia, unspecified hyperlipidemia type             Discharge Procedure Orders (must include Diet, Follow-up, Activity):   Discharge Procedure Orders (must include Diet, Follow-up, Activity)   Diet general     Call MD for:  temperature >100.4     Call MD for:  persistent nausea and vomiting     Call MD for:  severe uncontrolled pain     Call MD for:  difficulty breathing, headache or visual disturbances     Call MD for:  redness, tenderness, or signs of infection (pain, swelling, redness, odor or green/yellow discharge around incision site)     Call MD for:  persistent dizziness or light-headedness        Follow Up:  Follow up as scheduled.  Resume routine diet.  Activity as tolerated.    No driving day of procedure.

## 2023-06-19 NOTE — H&P
[facility] Surgery H&P Note    Subjective:       Patient ID: Marquita Roe is a 57 y.o. female.    Chief Complaint:  I need a screening colonoscopy.  HPI:  Marquita Roe is a 57 y.o. female presents today for evaluation of screening colonoscopy.    The patient has no hematochezia.    The patient has no known family history of colon cancer.    The patient admits to no weight loss or bowel habit changes.    Past Medical History:   Diagnosis Date    Breast mass, right     Discharge from right nipple     Hyperlipidemia     Hypertension     Inversion of nipple     Right    Mitral valve regurgitation     Murmur      Past Surgical History:   Procedure Laterality Date    ANTERIOR CRUCIATE LIGAMENT REPAIR      BREAST BIOPSY Right     core bx:Benign breast tissue with duct ectasia surrounded by chronic inflammation and fibrosis    BREAST BIOPSY Right 2018    exc bx:- Extensive mammary duct ectasia    BREAST LUMPECTOMY  2018    BREAST MASS EXCISION  2018    BREAST SURGERY  2018    Lumpectomy on right breast.  Non cancerous     SECTION  90    Two births by csection    COSMETIC SURGERY  ??    Broken nose nasal surgery    EXCISIONAL BIOPSY Right 2018    Procedure: EXCISIONAL BIOPSY-breast  w/SEED LOCALIZATION;  Surgeon: Chayo Meraz MD;  Location: [facility] OR [address] [place] [address];  Service: General;  Laterality: Right;    FLEXIBLE SIGMOIDOSCOPY N/A 2017    FRACTURE SURGERY  2013    ACL surgery right knee    NOSE SURGERY       Family History   Problem Relation Age of Onset    Cancer Mother 69        Mother passed from lung cancer due to a lifetime of smoking    Heart disease Father         Fatger had a pace maker    Hypertension Father         High blood pressure    Stroke Father         Passes at age 56 due to stroke    Stroke Sister         Stroke 4 years ago.  Survived but bed and wheel chair .     Social History     Socioeconomic History    Marital status:   "  Tobacco Use    Smoking status: Never    Smokeless tobacco: Never   Substance and Sexual Activity    Alcohol use: Yes     Alcohol/week: 1.0 standard drink     Types: 1 Glasses of wine per week     Comment: Sometimes red wine once a week . 1 or 2 glasses    Drug use: Never    Sexual activity: Yes     Partners: Male     Birth control/protection: Partner-Vasectomy     Comment:  34 years together 39       Current Facility-Administered Medications   Medication Dose Route Frequency Provider Last Rate Last Admin    famotidine (PF) injection 20 mg  20 mg Intravenous Once Ray Drake MD        lactated ringers infusion   Intravenous Continuous Ray Drake MD        LIDOcaine (PF) 10 mg/ml (1%) injection 10 mg  1 mL Intradermal Once Ray Drake MD         Review of patient's allergies indicates:  No Known Allergies    10 point review of systems negative.    Objective:      Vitals:    06/19/23 0659 06/19/23 0700   BP: 107/81 107/81   BP Location: Left arm    Patient Position: Sitting    Pulse: 60    Resp: 16    Temp: 98.5 °F (36.9 °C)    TempSrc: Oral    SpO2: 97%    Weight: 68 kg (150 lb)    Height: 5' 6" (1.676 m)      Physical examination.  General well-developed well-nourished female in no acute distress.  Cardiovascular regular rate and rhythm.  Lungs nonlabored breathing, clear to auscultation bilateral  Abdomen soft nontender nondistended  Extremities no cyanosis clubbing or edema  Neuro afocal  Skin no rashes bruises or abrasions.         Assessment:  In need of screening colonoscopy.    Plan:  Screening colonoscopy.    Medical Decision Making/Counseling:  Risk and benefits of screening colonoscopy have been discussed in detail with the patient in preoperative holding.  Risk of bleeding (significant 1 in 500 cases), perforation (1 in 5000 cases), aspiration, need for further surgeries, need for admission to the hospital, need for blood transfusions etcetera have all been discussed.  During the " procedure, small polyps (colon growths) will be removed and any inflammation irritation or masslike structures will be biopsied.  Patient voiced understanding, and agreement.  After informed discussion with the patient in preoperative holding, they voiced understanding of the risk benefits of the outlined procedure and desired to proceed today with signed informed consent.  All questions were answered to the patient's satisfaction.  They will be followed up if there is any pathology to be reviewed in surgery clinic in the next couple of weeks for evaluation

## 2023-06-19 NOTE — ANESTHESIA PREPROCEDURE EVALUATION
06/19/2023  Marquita Roe is a 57 y.o., female.      Pre-op Assessment    I have reviewed the Patient Summary Reports.     I have reviewed the Nursing Notes. I have reviewed the NPO Status.   I have reviewed the Medications.     Review of Systems  Social:  Non-Smoker    Hematology/Oncology:  Hematology Normal   Oncology Normal     EENT/Dental:EENT/Dental Normal   Cardiovascular:   Hypertension Valvular problems/Murmurs, MR    Pulmonary:  Pulmonary Normal    Renal/:  Renal/ Normal     Hepatic/GI:  Hepatic/GI Normal    Musculoskeletal:  Musculoskeletal Normal    Neurological:  Neurology Normal    Endocrine:  Endocrine Normal    Dermatological:  Skin Normal    Psych:  Psychiatric Normal           Physical Exam    Airway:  Mallampati: II   Mouth Opening: Normal  TM Distance: Normal  Tongue: Normal  Neck ROM: Normal ROM    Chest/Lungs:  Clear to auscultation    Heart:  Rate: Normal  Rhythm: Regular Rhythm        Anesthesia Plan  Type of Anesthesia, risks & benefits discussed:    Anesthesia Type: Gen Natural Airway  Intra-op Monitoring Plan: Standard ASA Monitors  Post Op Pain Control Plan: multimodal analgesia  Induction:  IV  Informed Consent: Informed consent signed with the Patient and all parties understand the risks and agree with anesthesia plan.  All questions answered.   ASA Score: 2  Day of Surgery Review of History & Physical: H&P Update referred to the surgeon/provider.    Ready For Surgery From Anesthesia Perspective.     .

## 2023-06-19 NOTE — PLAN OF CARE
D/C instructions provided and explained to pt and pt's spouse, printed AVS provided, understanding of D/C instructions verbalized.  IV removed, catheter intact. Tolerated well. Telemetry monitor removed. VSS. Pt denies complaints. Transported off unit via wheelchair.

## 2023-06-19 NOTE — ANESTHESIA POSTPROCEDURE EVALUATION
Anesthesia Post Evaluation    Patient: Marquita Roe    Procedure(s) Performed: Procedure(s) (LRB):  COLONOSCOPY (N/A)    Final Anesthesia Type: general      Patient location during evaluation: PACU  Patient participation: Yes- Able to Participate  Level of consciousness: awake and alert  Post-procedure vital signs: reviewed and stable  Pain management: adequate  Airway patency: patent    PONV status at discharge: No PONV  Anesthetic complications: no      Cardiovascular status: blood pressure returned to baseline  Respiratory status: unassisted  Hydration status: euvolemic  Follow-up not needed.          Vitals Value Taken Time   BP 96/79 06/19/23 0847   Temp 36.8 °C (98.3 °F) 06/19/23 0815   Pulse 69 06/19/23 0851   Resp 18 06/19/23 0851   SpO2 83 % 06/19/23 0848   Vitals shown include unvalidated device data.      Event Time   Out of Recovery 08:33:00         Pain/Bianca Score: Bianca Score: 10 (6/19/2023  8:55 AM)  Modified Bianca Score: 20 (6/19/2023  8:55 AM)

## 2023-06-19 NOTE — TRANSFER OF CARE
"Anesthesia Transfer of Care Note    Patient: Marquita Roe    Procedure(s) Performed: Procedure(s) (LRB):  COLONOSCOPY (N/A)    Patient location: PACU    Anesthesia Type: general    Transport from OR: Transported from OR on room air with adequate spontaneous ventilation    Post pain: adequate analgesia    Post assessment: no apparent anesthetic complications and tolerated procedure well    Post vital signs: stable    Level of consciousness: awake, alert and oriented    Nausea/Vomiting: no nausea/vomiting    Complications: none    Transfer of care protocol was followed      Last vitals:   Visit Vitals  /81   Pulse 60   Temp 36.9 °C (98.5 °F) (Oral)   Resp 16   Ht 5' 6" (1.676 m)   Wt 68 kg (150 lb)   SpO2 97%   Breastfeeding No   BMI 24.21 kg/m²     "

## 2023-06-22 ENCOUNTER — PATIENT MESSAGE (OUTPATIENT)
Dept: FAMILY MEDICINE | Facility: CLINIC | Age: 58
End: 2023-06-22
Payer: COMMERCIAL

## 2023-06-22 RX ORDER — NITROFURANTOIN 25; 75 MG/1; MG/1
100 CAPSULE ORAL 2 TIMES DAILY
Qty: 10 CAPSULE | Refills: 0 | Status: SHIPPED | OUTPATIENT
Start: 2023-06-22 | End: 2023-06-27

## 2023-06-23 ENCOUNTER — PATIENT MESSAGE (OUTPATIENT)
Dept: FAMILY MEDICINE | Facility: CLINIC | Age: 58
End: 2023-06-23
Payer: COMMERCIAL

## 2023-06-23 RX ORDER — MELOXICAM 15 MG/1
15 TABLET ORAL DAILY
Qty: 30 TABLET | Refills: 0 | Status: SHIPPED | OUTPATIENT
Start: 2023-06-23 | End: 2023-07-23

## 2023-06-30 DIAGNOSIS — I10 PRIMARY HYPERTENSION: ICD-10-CM

## 2023-07-23 RX ORDER — MELOXICAM 15 MG/1
TABLET ORAL
Qty: 30 TABLET | Refills: 0 | Status: SHIPPED | OUTPATIENT
Start: 2023-07-23

## 2023-07-24 ENCOUNTER — PATIENT MESSAGE (OUTPATIENT)
Dept: GASTROENTEROLOGY | Facility: CLINIC | Age: 58
End: 2023-07-24
Payer: COMMERCIAL

## 2023-07-24 ENCOUNTER — TELEPHONE (OUTPATIENT)
Dept: GASTROENTEROLOGY | Facility: CLINIC | Age: 58
End: 2023-07-24
Payer: COMMERCIAL

## 2023-07-24 NOTE — TELEPHONE ENCOUNTER
Second attempt to schedule C-scope. Pt doesn't answer phone call nor answer [employer] msg. I leave pt a brief vm to call back to schedule C-scope. Cancellation letter is sent to pt's current address. Case is closed.

## 2023-08-30 DIAGNOSIS — Z11.59 NEED FOR HEPATITIS C SCREENING TEST: ICD-10-CM

## 2023-09-27 ENCOUNTER — PATIENT MESSAGE (OUTPATIENT)
Dept: FAMILY MEDICINE | Facility: CLINIC | Age: 58
End: 2023-09-27
Payer: COMMERCIAL

## 2023-10-02 ENCOUNTER — OFFICE VISIT (OUTPATIENT)
Dept: FAMILY MEDICINE | Facility: CLINIC | Age: 58
End: 2023-10-02
Payer: COMMERCIAL

## 2023-10-02 ENCOUNTER — LAB VISIT (OUTPATIENT)
Dept: LAB | Facility: HOSPITAL | Age: 58
End: 2023-10-02
Attending: FAMILY MEDICINE
Payer: COMMERCIAL

## 2023-10-02 VITALS
WEIGHT: 154.38 LBS | DIASTOLIC BLOOD PRESSURE: 78 MMHG | SYSTOLIC BLOOD PRESSURE: 122 MMHG | BODY MASS INDEX: 24.81 KG/M2 | HEIGHT: 66 IN | RESPIRATION RATE: 16 BRPM

## 2023-10-02 DIAGNOSIS — Z11.59 NEED FOR HEPATITIS C SCREENING TEST: ICD-10-CM

## 2023-10-02 DIAGNOSIS — I10 PRIMARY HYPERTENSION: ICD-10-CM

## 2023-10-02 DIAGNOSIS — Z13.1 SCREENING FOR DIABETES MELLITUS: ICD-10-CM

## 2023-10-02 DIAGNOSIS — Z00.00 ROUTINE GENERAL MEDICAL EXAMINATION AT A HEALTH CARE FACILITY: Primary | ICD-10-CM

## 2023-10-02 LAB
ALBUMIN SERPL BCP-MCNC: 4.3 G/DL (ref 3.5–5.2)
ALP SERPL-CCNC: 38 U/L (ref 55–135)
ALT SERPL W/O P-5'-P-CCNC: 25 U/L (ref 10–44)
ANION GAP SERPL CALC-SCNC: 10 MMOL/L (ref 8–16)
AST SERPL-CCNC: 22 U/L (ref 10–40)
BILIRUB SERPL-MCNC: 0.4 MG/DL (ref 0.1–1)
BUN SERPL-MCNC: 14 MG/DL (ref 6–20)
CALCIUM SERPL-MCNC: 9.9 MG/DL (ref 8.7–10.5)
CHLORIDE SERPL-SCNC: 106 MMOL/L (ref 95–110)
CO2 SERPL-SCNC: 28 MMOL/L (ref 23–29)
CREAT SERPL-MCNC: 0.8 MG/DL (ref 0.5–1.4)
EST. GFR  (NO RACE VARIABLE): >60 ML/MIN/1.73 M^2
ESTIMATED AVG GLUCOSE: 103 MG/DL (ref 68–131)
GLUCOSE SERPL-MCNC: 92 MG/DL (ref 70–110)
HBA1C MFR BLD: 5.2 % (ref 4–5.6)
HCV AB SERPL QL IA: NORMAL
POTASSIUM SERPL-SCNC: 3.5 MMOL/L (ref 3.5–5.1)
PROT SERPL-MCNC: 7.3 G/DL (ref 6–8.4)
SODIUM SERPL-SCNC: 144 MMOL/L (ref 136–145)

## 2023-10-02 PROCEDURE — 3074F SYST BP LT 130 MM HG: CPT | Mod: S$GLB,,, | Performed by: FAMILY MEDICINE

## 2023-10-02 PROCEDURE — 3008F PR BODY MASS INDEX (BMI) DOCUMENTED: ICD-10-PCS | Mod: S$GLB,,, | Performed by: FAMILY MEDICINE

## 2023-10-02 PROCEDURE — 3044F HG A1C LEVEL LT 7.0%: CPT | Mod: S$GLB,,, | Performed by: FAMILY MEDICINE

## 2023-10-02 PROCEDURE — 99999 PR PBB SHADOW E&M-EST. PATIENT-LVL III: ICD-10-PCS | Mod: PBBFAC,,, | Performed by: FAMILY MEDICINE

## 2023-10-02 PROCEDURE — 99396 PREV VISIT EST AGE 40-64: CPT | Mod: S$GLB,,, | Performed by: FAMILY MEDICINE

## 2023-10-02 PROCEDURE — 3008F BODY MASS INDEX DOCD: CPT | Mod: S$GLB,,, | Performed by: FAMILY MEDICINE

## 2023-10-02 PROCEDURE — 3044F PR MOST RECENT HEMOGLOBIN A1C LEVEL <7.0%: ICD-10-PCS | Mod: S$GLB,,, | Performed by: FAMILY MEDICINE

## 2023-10-02 PROCEDURE — 99999 PR PBB SHADOW E&M-EST. PATIENT-LVL III: CPT | Mod: PBBFAC,,, | Performed by: FAMILY MEDICINE

## 2023-10-02 PROCEDURE — 80053 COMPREHEN METABOLIC PANEL: CPT | Performed by: FAMILY MEDICINE

## 2023-10-02 PROCEDURE — 3074F PR MOST RECENT SYSTOLIC BLOOD PRESSURE < 130 MM HG: ICD-10-PCS | Mod: S$GLB,,, | Performed by: FAMILY MEDICINE

## 2023-10-02 PROCEDURE — 3078F DIAST BP <80 MM HG: CPT | Mod: S$GLB,,, | Performed by: FAMILY MEDICINE

## 2023-10-02 PROCEDURE — 86803 HEPATITIS C AB TEST: CPT | Performed by: FAMILY MEDICINE

## 2023-10-02 PROCEDURE — 99396 PR PREVENTIVE VISIT,EST,40-64: ICD-10-PCS | Mod: S$GLB,,, | Performed by: FAMILY MEDICINE

## 2023-10-02 PROCEDURE — 1159F MED LIST DOCD IN RCRD: CPT | Mod: S$GLB,,, | Performed by: FAMILY MEDICINE

## 2023-10-02 PROCEDURE — 3078F PR MOST RECENT DIASTOLIC BLOOD PRESSURE < 80 MM HG: ICD-10-PCS | Mod: S$GLB,,, | Performed by: FAMILY MEDICINE

## 2023-10-02 PROCEDURE — 36415 COLL VENOUS BLD VENIPUNCTURE: CPT | Performed by: FAMILY MEDICINE

## 2023-10-02 PROCEDURE — 83036 HEMOGLOBIN GLYCOSYLATED A1C: CPT | Performed by: FAMILY MEDICINE

## 2023-10-02 PROCEDURE — 1159F PR MEDICATION LIST DOCUMENTED IN MEDICAL RECORD: ICD-10-PCS | Mod: S$GLB,,, | Performed by: FAMILY MEDICINE

## 2023-10-02 NOTE — PROGRESS NOTES
Subjective:       Patient ID: Marquita Roe is a 58 y.o. female.    Chief Complaint: Annual Exam (Patient states today is her annual follow up.)    Presents today for annual exam.     Mild sinus symptoms and bruising on right ankle.           Review of Systems   Constitutional:  Negative for activity change, appetite change and fever.   HENT:  Positive for nasal congestion.    Eyes:  Negative for visual disturbance.   Respiratory:  Negative for cough and shortness of breath.    Cardiovascular:  Negative for chest pain.   Endocrine: Negative for polydipsia, polyphagia and polyuria.   Genitourinary:  Negative for dysuria and pelvic pain.   Musculoskeletal:  Negative for arthralgias and myalgias.   Integumentary:  Negative for breast mass.   Neurological:  Negative for headaches.   Psychiatric/Behavioral:  Negative for sleep disturbance.    Breast: Negative for mass        Objective:      Physical Exam  Vitals and nursing note reviewed.   Constitutional:       General: She is not in acute distress.     Appearance: She is not ill-appearing.   Cardiovascular:      Rate and Rhythm: Normal rate and regular rhythm.      Heart sounds: No murmur heard.  Pulmonary:      Effort: Pulmonary effort is normal.      Breath sounds: Normal breath sounds. No wheezing.   Skin:     General: Skin is warm and dry.      Findings: No rash.   Neurological:      Mental Status: She is alert.   Psychiatric:         Mood and Affect: Mood normal.         Behavior: Behavior normal.         Assessment:       1. Routine general medical examination at a health care facility    2. Screening for diabetes mellitus        Plan:       Problem List Items Addressed This Visit    None  Visit Diagnoses       Routine general medical examination at a health care facility    -  Primary    Screening for diabetes mellitus        Relevant Orders    Hemoglobin A1C (Completed)            Doing great. Lipid panel done this calendar year. Needs A1c.    Annual exam done  today

## 2023-10-02 NOTE — PATIENT INSTRUCTIONS
Thank you for allowing me to participate in your care today. It is an honor to be a part of your healthcare team at [facility]. If you had labs ordered today, you will receive notification via [employer], phone call or mailed letter regarding your results within 7 days. If you have any questions or concerns regarding your visit today, please do not hesitate to contact us.  Sincerely,   Leanna Pickett M.D.

## 2024-03-27 ENCOUNTER — PATIENT MESSAGE (OUTPATIENT)
Dept: FAMILY MEDICINE | Facility: CLINIC | Age: 59
End: 2024-03-27

## 2024-03-27 ENCOUNTER — OFFICE VISIT (OUTPATIENT)
Dept: FAMILY MEDICINE | Facility: CLINIC | Age: 59
End: 2024-03-27
Payer: COMMERCIAL

## 2024-03-27 VITALS
HEIGHT: 66 IN | OXYGEN SATURATION: 96 % | DIASTOLIC BLOOD PRESSURE: 75 MMHG | RESPIRATION RATE: 16 BRPM | SYSTOLIC BLOOD PRESSURE: 115 MMHG | BODY MASS INDEX: 25.05 KG/M2 | WEIGHT: 155.88 LBS | HEART RATE: 72 BPM

## 2024-03-27 DIAGNOSIS — B37.31 YEAST VAGINITIS: ICD-10-CM

## 2024-03-27 DIAGNOSIS — B96.89 BACTERIAL SINUSITIS: Primary | ICD-10-CM

## 2024-03-27 DIAGNOSIS — R92.8 ABNORMAL MAMMOGRAM OF RIGHT BREAST: ICD-10-CM

## 2024-03-27 DIAGNOSIS — I10 PRIMARY HYPERTENSION: ICD-10-CM

## 2024-03-27 DIAGNOSIS — J32.9 BACTERIAL SINUSITIS: Primary | ICD-10-CM

## 2024-03-27 PROCEDURE — 99999 PR PBB SHADOW E&M-EST. PATIENT-LVL III: CPT | Mod: PBBFAC,,, | Performed by: STUDENT IN AN ORGANIZED HEALTH CARE EDUCATION/TRAINING PROGRAM

## 2024-03-27 PROCEDURE — 3008F BODY MASS INDEX DOCD: CPT | Mod: S$GLB,,, | Performed by: STUDENT IN AN ORGANIZED HEALTH CARE EDUCATION/TRAINING PROGRAM

## 2024-03-27 PROCEDURE — 3074F SYST BP LT 130 MM HG: CPT | Mod: S$GLB,,, | Performed by: STUDENT IN AN ORGANIZED HEALTH CARE EDUCATION/TRAINING PROGRAM

## 2024-03-27 PROCEDURE — 3078F DIAST BP <80 MM HG: CPT | Mod: S$GLB,,, | Performed by: STUDENT IN AN ORGANIZED HEALTH CARE EDUCATION/TRAINING PROGRAM

## 2024-03-27 PROCEDURE — 99213 OFFICE O/P EST LOW 20 MIN: CPT | Mod: S$GLB,,, | Performed by: STUDENT IN AN ORGANIZED HEALTH CARE EDUCATION/TRAINING PROGRAM

## 2024-03-27 PROCEDURE — 1159F MED LIST DOCD IN RCRD: CPT | Mod: S$GLB,,, | Performed by: STUDENT IN AN ORGANIZED HEALTH CARE EDUCATION/TRAINING PROGRAM

## 2024-03-27 RX ORDER — FLUCONAZOLE 150 MG/1
150 TABLET ORAL DAILY
Qty: 1 TABLET | Refills: 0 | Status: SHIPPED | OUTPATIENT
Start: 2024-03-27 | End: 2024-03-28

## 2024-03-27 RX ORDER — ATENOLOL AND CHLORTHALIDONE TABLET 50; 25 MG/1; MG/1
1 TABLET ORAL EVERY MORNING
Qty: 90 TABLET | Refills: 3 | Status: SHIPPED | OUTPATIENT
Start: 2024-03-27

## 2024-03-27 RX ORDER — AMOXICILLIN 500 MG/1
500 TABLET, FILM COATED ORAL EVERY 12 HOURS
Qty: 20 TABLET | Refills: 0 | Status: SHIPPED | OUTPATIENT
Start: 2024-03-27 | End: 2024-04-06

## 2024-03-27 NOTE — ASSESSMENT & PLAN NOTE
Stable. Continue current medications and regular followup.  Hypertension Medications               atenoloL-chlorthalidone (TENORETIC) 50-25 mg Tab Take 1 tablet by mouth every morning.

## 2024-03-27 NOTE — PROGRESS NOTES
Subjective:       Patient ID: Marquita Roe is a 58 y.o. female.    Chief Complaint: Sinusitis (Started over the weekend after cleaning outside in pollen loss of voice started yesterday. Same symptoms in the past and it was a Acute bacterial sinusitis 11/11/22)    Started about a week ago  Sneezing and sinus pressure  Using otc meds and then worsening with much more phlem and congestion and loss of voice.  Ear is full and painful.  Green mucus and facial/head pain.  Difficult to take decongestants   Taking mucinex      Review of Systems   Constitutional:  Negative for activity change and appetite change.   Respiratory:  Negative for shortness of breath.    Cardiovascular:  Negative for chest pain.   Gastrointestinal:  Negative for abdominal pain.   Genitourinary:  Negative for dysuria.   Integumentary:  Negative for rash.   Psychiatric/Behavioral:  Negative for dysphoric mood and sleep disturbance. The patient is not nervous/anxious.          Objective:      Physical Exam  Constitutional:       General: She is not in acute distress.     Appearance: Normal appearance. She is not ill-appearing.   HENT:      Right Ear: Ear canal and external ear normal. There is no impacted cerumen. Tympanic membrane is erythematous.      Left Ear: Ear canal and external ear normal. There is no impacted cerumen. Tympanic membrane is erythematous.   Eyes:      Conjunctiva/sclera: Conjunctivae normal.   Cardiovascular:      Rate and Rhythm: Normal rate and regular rhythm.      Heart sounds: Normal heart sounds. No murmur heard.  Pulmonary:      Effort: Pulmonary effort is normal. No respiratory distress.      Breath sounds: Normal breath sounds. No wheezing, rhonchi or rales.   Musculoskeletal:      Right lower leg: No edema.      Left lower leg: No edema.   Lymphadenopathy:      Cervical: No cervical adenopathy.   Skin:     General: Skin is warm and dry.   Neurological:      Mental Status: She is alert. Mental status is at baseline.       Gait: Gait normal.   Psychiatric:         Mood and Affect: Mood normal.         Behavior: Behavior normal.         Thought Content: Thought content normal.         Judgment: Judgment normal.         Assessment:       1. Bacterial sinusitis    2. Primary hypertension    3. Yeast vaginitis    4. Abnormal mammogram of right breast        Plan:       Problem List Items Addressed This Visit          Cardiac/Vascular    Primary hypertension     Stable. Continue current medications and regular followup.  Hypertension Medications               atenoloL-chlorthalidone (TENORETIC) 50-25 mg Tab Take 1 tablet by mouth every morning.                 Relevant Medications    atenoloL-chlorthalidone (TENORETIC) 50-25 mg Tab       Renal/    Abnormal mammogram of right breast     Had a lumpectomy and getting her mammogram/followup in [place]          Other Visit Diagnoses       Bacterial sinusitis    -  Primary    pocket script for antibiotics. continue sinus rinses, flonase    Relevant Medications    amoxicillin (AMOXIL) 500 MG Tab    Yeast vaginitis        Relevant Medications    fluconazole (DIFLUCAN) 150 MG Tab

## 2024-03-27 NOTE — LETTER
March 27, 2024      [address]  [facility] [place] [address]  Phone: [phone]  Fax: [phone]       Patient: Marquita Roe   YOB: 1965  Date of Visit: 03/27/2024    To Whom It May Concern:    Etienne Roe  was at [facility] on 03/27/2024. The patient may return to work/school on 3/28/2023 with no restrictions. If you have any questions or concerns, or if I can be of further assistance, please do not hesitate to contact me.    Sincerely,    Olivier Antunez MA

## 2024-04-11 DIAGNOSIS — E78.5 HYPERLIPIDEMIA, UNSPECIFIED HYPERLIPIDEMIA TYPE: ICD-10-CM

## 2024-04-11 RX ORDER — LOVASTATIN 20 MG/1
20 TABLET ORAL NIGHTLY
Qty: 90 TABLET | Refills: 0 | Status: SHIPPED | OUTPATIENT
Start: 2024-04-11

## 2024-04-11 NOTE — TELEPHONE ENCOUNTER
Refill Routing Note   Medication(s) are not appropriate for processing by [facility] for the following reason(s):        Required labs outdated    [facility] action(s):  Defer   Requires labs : Yes             Appointments  past 12m or future 3m with PCP    Date Provider   Last Visit   10/2/2023 Leanna Pickett MD   Next Visit   10/7/2024 Leanna Pickett MD   ED visits in past 90 days: 0        Note composed:11:01 AM 04/11/2024

## 2024-04-11 NOTE — TELEPHONE ENCOUNTER
Care Due:                  Date            Visit Type   Department     Provider  --------------------------------------------------------------------------------                                MYCHART                              FOLLOWUP/OF  [facility]  Last Visit: 03-      FICE VISIT   MEDICINE       Maryse Vitale                              EP -                              PRIMARY      [facility]  Next Visit: 10-      CARE ([facility])   MEDICINE       Leanna Pickett                                                            Last  Test          Frequency    Reason                     Performed    Due Date  --------------------------------------------------------------------------------    CBC.........  12 months..  fenofibrate..............  06- 06-    Lipid Panel.  12 months..  fenofibrate, lovastatin..  02-   02-    [facility] Embedded Care Due Messages. Reference number: [id number].   4/11/2024 12:46:06 AM CDT

## 2024-04-13 ENCOUNTER — PATIENT MESSAGE (OUTPATIENT)
Dept: FAMILY MEDICINE | Facility: CLINIC | Age: 59
End: 2024-04-13
Payer: COMMERCIAL

## 2024-06-03 DIAGNOSIS — E78.5 HYPERLIPIDEMIA, UNSPECIFIED HYPERLIPIDEMIA TYPE: ICD-10-CM

## 2024-06-04 RX ORDER — FENOFIBRATE 54 MG/1
54 TABLET ORAL NIGHTLY
Qty: 90 TABLET | Refills: 1 | Status: SHIPPED | OUTPATIENT
Start: 2024-06-04

## 2024-06-04 NOTE — TELEPHONE ENCOUNTER
Refill Routing Note   Medication(s) are not appropriate for processing by [facility] for the following reason(s):        Required labs outdated    [facility] action(s):  Defer               Appointments  past 12m or future 3m with PCP    Date Provider   Last Visit   10/2/2023 Leanna Pickett MD   Next Visit   10/7/2024 Leanna Pickett MD   ED visits in past 90 days: 0        Note composed:8:22 AM 06/04/2024

## 2024-06-04 NOTE — TELEPHONE ENCOUNTER
No care due was identified.  [facility] Embedded Care Due Messages. Reference number: [id number].   6/03/2024 9:25:32 PM CDT

## 2024-06-18 ENCOUNTER — HOSPITAL ENCOUNTER (OUTPATIENT)
Dept: RADIOLOGY | Facility: HOSPITAL | Age: 59
Discharge: HOME | End: 2024-06-18
Attending: NURSE PRACTITIONER
Payer: COMMERCIAL

## 2024-06-18 DIAGNOSIS — Z12.31 SCREENING MAMMOGRAM, ENCOUNTER FOR: ICD-10-CM

## 2024-06-18 PROCEDURE — 77067 SCR MAMMO BI INCL CAD: CPT | Mod: TC

## 2024-06-18 PROCEDURE — 77067 SCR MAMMO BI INCL CAD: CPT | Mod: 26,,, | Performed by: RADIOLOGY

## 2024-06-18 PROCEDURE — 77063 BREAST TOMOSYNTHESIS BI: CPT | Mod: 26,,, | Performed by: RADIOLOGY

## 2024-08-28 DIAGNOSIS — E78.5 HYPERLIPIDEMIA, UNSPECIFIED HYPERLIPIDEMIA TYPE: ICD-10-CM

## 2024-08-28 RX ORDER — LOVASTATIN 20 MG/1
20 TABLET ORAL NIGHTLY
Qty: 90 TABLET | Refills: 0 | Status: SHIPPED | OUTPATIENT
Start: 2024-08-28

## 2024-08-28 NOTE — TELEPHONE ENCOUNTER
Care Due:                  Date            Visit Type   Department     Provider  --------------------------------------------------------------------------------                                MYCHART                              FOLLOWUP/OF  [facility] FAMILY  Last Visit: 03-      FICE VISIT   MEDICINE       Maryse Vitale                              EP -                              PRIMARY      [facility]  Next Visit: 10-      CARE ([facility])   MEDICINE       Leanna Pickett                                                            Last  Test          Frequency    Reason                     Performed    Due Date  --------------------------------------------------------------------------------    CBC.........  12 months..  fenofibrate..............  Not Found    Overdue    CMP.........  12 months..  atenoloL-chlorthalidone,   10-   09-                             fenofibrate, lovastatin..    [facility] [employer] Embedded Care Due Messages. Reference number: [id number].   8/28/2024 12:25:42 AM CDT

## 2024-08-28 NOTE — TELEPHONE ENCOUNTER
Provider Staff:  Action required for this patient    Requires labs      Please see care gap opportunities below in Care Due Message.    Thanks!  [facility]     Appointments      Date Provider   Last Visit   10/2/2023 Leanna Pickett MD   Next Visit   10/7/2024 Leanna Pickett MD     Refill Decision Note   Marquita Roe  is requesting a refill authorization.  Brief Assessment and Rationale for Refill:  Approve     Medication Therapy Plan:         Comments:     Note composed:8:28 AM 08/28/2024

## 2024-09-28 ENCOUNTER — PATIENT MESSAGE (OUTPATIENT)
Dept: FAMILY MEDICINE | Facility: CLINIC | Age: 59
End: 2024-09-28
Payer: COMMERCIAL

## 2024-10-07 ENCOUNTER — LAB VISIT (OUTPATIENT)
Dept: LAB | Facility: HOSPITAL | Age: 59
End: 2024-10-07
Attending: FAMILY MEDICINE
Payer: COMMERCIAL

## 2024-10-07 ENCOUNTER — OFFICE VISIT (OUTPATIENT)
Dept: FAMILY MEDICINE | Facility: CLINIC | Age: 59
End: 2024-10-07
Payer: COMMERCIAL

## 2024-10-07 VITALS
HEIGHT: 66 IN | OXYGEN SATURATION: 99 % | SYSTOLIC BLOOD PRESSURE: 112 MMHG | DIASTOLIC BLOOD PRESSURE: 84 MMHG | RESPIRATION RATE: 18 BRPM | WEIGHT: 154.63 LBS | HEART RATE: 62 BPM | BODY MASS INDEX: 24.85 KG/M2

## 2024-10-07 DIAGNOSIS — G25.81 RESTLESS LEGS: ICD-10-CM

## 2024-10-07 DIAGNOSIS — E78.2 MIXED HYPERLIPIDEMIA: ICD-10-CM

## 2024-10-07 DIAGNOSIS — E78.2 MIXED HYPERLIPIDEMIA: Primary | ICD-10-CM

## 2024-10-07 DIAGNOSIS — I10 PRIMARY HYPERTENSION: ICD-10-CM

## 2024-10-07 LAB
BASOPHILS # BLD AUTO: 0.07 K/UL (ref 0–0.2)
BASOPHILS NFR BLD: 1.1 % (ref 0–1.9)
CHOLEST SERPL-MCNC: 214 MG/DL (ref 120–199)
CHOLEST/HDLC SERPL: 4.7 {RATIO} (ref 2–5)
DIFFERENTIAL METHOD BLD: NORMAL
EOSINOPHIL # BLD AUTO: 0.2 K/UL (ref 0–0.5)
EOSINOPHIL NFR BLD: 3.5 % (ref 0–8)
ERYTHROCYTE [DISTWIDTH] IN BLOOD BY AUTOMATED COUNT: 12.2 % (ref 11.5–14.5)
FERRITIN SERPL-MCNC: 173 NG/ML (ref 20–300)
HCT VFR BLD AUTO: 43.1 % (ref 37–48.5)
HDLC SERPL-MCNC: 46 MG/DL (ref 40–75)
HDLC SERPL: 21.5 % (ref 20–50)
HGB BLD-MCNC: 14.6 G/DL (ref 12–16)
IMM GRANULOCYTES # BLD AUTO: 0.01 K/UL (ref 0–0.04)
IMM GRANULOCYTES NFR BLD AUTO: 0.2 % (ref 0–0.5)
LDLC SERPL CALC-MCNC: 135.6 MG/DL (ref 63–159)
LYMPHOCYTES # BLD AUTO: 2.7 K/UL (ref 1–4.8)
LYMPHOCYTES NFR BLD: 41.3 % (ref 18–48)
MCH RBC QN AUTO: 30.8 PG (ref 27–31)
MCHC RBC AUTO-ENTMCNC: 33.9 G/DL (ref 32–36)
MCV RBC AUTO: 91 FL (ref 82–98)
MONOCYTES # BLD AUTO: 0.5 K/UL (ref 0.3–1)
MONOCYTES NFR BLD: 7.1 % (ref 4–15)
NEUTROPHILS # BLD AUTO: 3.1 K/UL (ref 1.8–7.7)
NEUTROPHILS NFR BLD: 46.8 % (ref 38–73)
NONHDLC SERPL-MCNC: 168 MG/DL
NRBC BLD-RTO: 0 /100 WBC
PLATELET # BLD AUTO: 284 K/UL (ref 150–450)
PMV BLD AUTO: 10.6 FL (ref 9.2–12.9)
RBC # BLD AUTO: 4.74 M/UL (ref 4–5.4)
TRIGL SERPL-MCNC: 162 MG/DL (ref 30–150)
WBC # BLD AUTO: 6.52 K/UL (ref 3.9–12.7)

## 2024-10-07 PROCEDURE — 3079F DIAST BP 80-89 MM HG: CPT | Mod: S$GLB,,, | Performed by: FAMILY MEDICINE

## 2024-10-07 PROCEDURE — 3074F SYST BP LT 130 MM HG: CPT | Mod: S$GLB,,, | Performed by: FAMILY MEDICINE

## 2024-10-07 PROCEDURE — 3044F HG A1C LEVEL LT 7.0%: CPT | Mod: S$GLB,,, | Performed by: FAMILY MEDICINE

## 2024-10-07 PROCEDURE — 36415 COLL VENOUS BLD VENIPUNCTURE: CPT | Performed by: FAMILY MEDICINE

## 2024-10-07 PROCEDURE — 3008F BODY MASS INDEX DOCD: CPT | Mod: S$GLB,,, | Performed by: FAMILY MEDICINE

## 2024-10-07 PROCEDURE — 82728 ASSAY OF FERRITIN: CPT | Performed by: FAMILY MEDICINE

## 2024-10-07 PROCEDURE — 1159F MED LIST DOCD IN RCRD: CPT | Mod: S$GLB,,, | Performed by: FAMILY MEDICINE

## 2024-10-07 PROCEDURE — 85025 COMPLETE CBC W/AUTO DIFF WBC: CPT | Performed by: FAMILY MEDICINE

## 2024-10-07 PROCEDURE — 1160F RVW MEDS BY RX/DR IN RCRD: CPT | Mod: S$GLB,,, | Performed by: FAMILY MEDICINE

## 2024-10-07 PROCEDURE — 80061 LIPID PANEL: CPT | Performed by: FAMILY MEDICINE

## 2024-10-07 PROCEDURE — 99214 OFFICE O/P EST MOD 30 MIN: CPT | Mod: S$GLB,,, | Performed by: FAMILY MEDICINE

## 2024-10-07 PROCEDURE — 99999 PR PBB SHADOW E&M-EST. PATIENT-LVL III: CPT | Mod: PBBFAC,,, | Performed by: FAMILY MEDICINE

## 2024-10-07 RX ORDER — LOVASTATIN 20 MG/1
20 TABLET ORAL NIGHTLY
Qty: 90 TABLET | Refills: 0 | Status: CANCELLED | OUTPATIENT
Start: 2024-10-07

## 2024-10-07 RX ORDER — FENOFIBRATE 54 MG/1
54 TABLET ORAL NIGHTLY
Qty: 90 TABLET | Refills: 1 | Status: CANCELLED | OUTPATIENT
Start: 2024-10-07

## 2024-10-07 NOTE — PROGRESS NOTES
Subjective:       Patient ID: Marquita Roe is a 59 y.o. female.    Chief Complaint: Follow-up (Review labs/Leg pain usually at night with cramps. )    Presents today for regular follow up.   She is worried about her cholesterol as she had very high triglycerides     She has some chronic pain in her legs at night. Has been this way since she as a child. She wants weight on her legs to sleep.     Abdominal pain after eating. Had gotten really bad and then it stopped. Reports that it is not every day but it can be more prevalent after she eats. Pasta makes her belly hurt- corn- popcorn. She had had colonoscopies and there was nothing there. She thinks that she is just intolerant to certain foods. She tried avoidance of gluten.   Unsure how often it happens.     She also has some chronic recurring coughing- her  told her to mention the cough. She had a bunch of sinus and congestion a few months back. Every now and then when she coughs she will get the phlegm. Her  states that it is a real deep chest cough.         .  Patient Active Problem List   Diagnosis    Abnormal mammogram of right breast    Exposure to COVID-19 virus    Neuroma    Foot pain, bilateral    Primary hypertension    Mixed hyperlipidemia     Marquita has a current medication list which includes the following prescription(s): atenolol-chlorthalidone, fenofibrate, and lovastatin.    Review of Systems   Constitutional:  Negative for activity change and appetite change.   Respiratory:  Negative for shortness of breath.          Health Maintenance Due   Topic Date Due    HIV Screening  Never done    TETANUS VACCINE  Never done    Shingles Vaccine (2 of 2) 11/16/2022    Influenza Vaccine (1) 09/01/2024    COVID-19 Vaccine (4 - 2024-25 season) 09/01/2024      Health Maintenance reviewed and discussed-    Objective:      Physical Exam  Vitals and nursing note reviewed.   Constitutional:       General: She is not in acute distress.     Appearance:  She is not ill-appearing.   Cardiovascular:      Rate and Rhythm: Normal rate and regular rhythm.      Heart sounds: No murmur heard.  Pulmonary:      Effort: Pulmonary effort is normal.      Breath sounds: Normal breath sounds. No wheezing.   Skin:     General: Skin is warm and dry.      Findings: No rash.   Neurological:      Mental Status: She is alert.   Psychiatric:         Mood and Affect: Mood normal.         Behavior: Behavior normal.         Assessment:       1. Mixed hyperlipidemia    2. Restless legs    3. Primary hypertension        Plan:       1. Mixed hyperlipidemia  Assessment & Plan:  Repeating lipid panel due to high triglycerides last visit    Orders:  -     Lipid Panel; Future; Expected date: 10/07/2024    2. Restless legs  -     CBC auto differential; Future; Expected date: 10/07/2024  -     Ferritin; Future; Expected date: 10/07/2024    3. Primary hypertension  Assessment & Plan:  Stable. Well controlled. Continue current medications.   Hypertension Medications               atenoloL-chlorthalidone (TENORETIC) 50-25 mg Tab Take 1 tablet by mouth every morning.

## 2024-10-07 NOTE — ASSESSMENT & PLAN NOTE
Stable. Well controlled. Continue current medications.   Hypertension Medications               atenoloL-chlorthalidone (TENORETIC) 50-25 mg Tab Take 1 tablet by mouth every morning.

## 2024-10-08 ENCOUNTER — PATIENT MESSAGE (OUTPATIENT)
Dept: FAMILY MEDICINE | Facility: CLINIC | Age: 59
End: 2024-10-08
Payer: COMMERCIAL

## 2024-10-08 DIAGNOSIS — I10 PRIMARY HYPERTENSION: ICD-10-CM

## 2024-10-15 NOTE — TELEPHONE ENCOUNTER
The 10-year ASCVD risk score (Cornelius DUNHAM, et al., 2019) is: 3.4%    Values used to calculate the score:      Age: 59 years      Sex: Female      Is Non- : No      Diabetic: No      Tobacco smoker: No      Systolic Blood Pressure: 112 mmHg      Is BP treated: Yes      HDL Cholesterol: 46 mg/dL      Total Cholesterol: 214 mg/dL

## 2024-10-18 DIAGNOSIS — I10 PRIMARY HYPERTENSION: ICD-10-CM

## 2024-10-18 RX ORDER — ATENOLOL AND CHLORTHALIDONE TABLET 50; 25 MG/1; MG/1
1 TABLET ORAL EVERY MORNING
Qty: 90 TABLET | Refills: 3 | Status: CANCELLED | OUTPATIENT
Start: 2024-10-18

## 2024-12-13 DIAGNOSIS — E78.5 HYPERLIPIDEMIA, UNSPECIFIED HYPERLIPIDEMIA TYPE: ICD-10-CM

## 2024-12-13 RX ORDER — FENOFIBRATE 54 MG/1
54 TABLET ORAL NIGHTLY
Qty: 90 TABLET | Refills: 1 | OUTPATIENT
Start: 2024-12-13

## 2024-12-13 NOTE — TELEPHONE ENCOUNTER
No care due was identified.  [facility] Embedded Care Due Messages. Reference number: [id number].   12/13/2024 12:36:02 AM CST

## 2024-12-13 NOTE — TELEPHONE ENCOUNTER
Refill Decision Note   Marquita Roe  is requesting a refill authorization.  Brief Assessment and Rationale for Refill:  Quick Discontinue     Medication Therapy Plan:  Discontinued by: Leanna Pickett MD on 10/21/2024      Comments:     Note composed:5:52 AM 12/13/2024

## 2025-03-02 ENCOUNTER — PATIENT MESSAGE (OUTPATIENT)
Dept: FAMILY MEDICINE | Facility: CLINIC | Age: 60
End: 2025-03-02
Payer: COMMERCIAL

## 2025-03-04 ENCOUNTER — PATIENT MESSAGE (OUTPATIENT)
Dept: INTERNAL MEDICINE | Facility: CLINIC | Age: 60
End: 2025-03-04
Payer: COMMERCIAL

## 2025-03-05 ENCOUNTER — LAB VISIT (OUTPATIENT)
Dept: LAB | Facility: HOSPITAL | Age: 60
End: 2025-03-05
Attending: FAMILY MEDICINE
Payer: COMMERCIAL

## 2025-03-05 DIAGNOSIS — I10 PRIMARY HYPERTENSION: ICD-10-CM

## 2025-03-05 DIAGNOSIS — E78.5 HYPERLIPIDEMIA, UNSPECIFIED HYPERLIPIDEMIA TYPE: ICD-10-CM

## 2025-03-05 LAB
ALBUMIN SERPL BCP-MCNC: 4.1 G/DL (ref 3.5–5.2)
ALP SERPL-CCNC: 38 U/L (ref 40–150)
ALT SERPL W/O P-5'-P-CCNC: 18 U/L (ref 10–44)
ANION GAP SERPL CALC-SCNC: 15 MMOL/L (ref 8–16)
AST SERPL-CCNC: 19 U/L (ref 10–40)
BILIRUB SERPL-MCNC: 0.5 MG/DL (ref 0.1–1)
BUN SERPL-MCNC: 12 MG/DL (ref 6–20)
CALCIUM SERPL-MCNC: 9.4 MG/DL (ref 8.7–10.5)
CHLORIDE SERPL-SCNC: 105 MMOL/L (ref 95–110)
CHOLEST SERPL-MCNC: 198 MG/DL (ref 120–199)
CHOLEST/HDLC SERPL: 5.2 {RATIO} (ref 2–5)
CO2 SERPL-SCNC: 23 MMOL/L (ref 23–29)
CREAT SERPL-MCNC: 0.7 MG/DL (ref 0.5–1.4)
EST. GFR  (NO RACE VARIABLE): >60 ML/MIN/1.73 M^2
GLUCOSE SERPL-MCNC: 92 MG/DL (ref 70–110)
HDLC SERPL-MCNC: 38 MG/DL (ref 40–75)
HDLC SERPL: 19.2 % (ref 20–50)
LDLC SERPL CALC-MCNC: 99.4 MG/DL (ref 63–159)
NONHDLC SERPL-MCNC: 160 MG/DL
POTASSIUM SERPL-SCNC: 3.7 MMOL/L (ref 3.5–5.1)
PROT SERPL-MCNC: 7 G/DL (ref 6–8.4)
SODIUM SERPL-SCNC: 143 MMOL/L (ref 136–145)
TRIGL SERPL-MCNC: 303 MG/DL (ref 30–150)

## 2025-03-05 PROCEDURE — 80053 COMPREHEN METABOLIC PANEL: CPT | Performed by: FAMILY MEDICINE

## 2025-03-05 PROCEDURE — 80061 LIPID PANEL: CPT | Performed by: FAMILY MEDICINE

## 2025-03-05 PROCEDURE — 36415 COLL VENOUS BLD VENIPUNCTURE: CPT | Performed by: FAMILY MEDICINE

## 2025-03-07 ENCOUNTER — RESULTS FOLLOW-UP (OUTPATIENT)
Dept: FAMILY MEDICINE | Facility: CLINIC | Age: 60
End: 2025-03-07

## 2025-03-15 ENCOUNTER — PATIENT MESSAGE (OUTPATIENT)
Dept: FAMILY MEDICINE | Facility: CLINIC | Age: 60
End: 2025-03-15
Payer: COMMERCIAL

## 2025-03-17 NOTE — TELEPHONE ENCOUNTER
Last office visit 10.07.24  Results reviewed by Dr. Vitale 3/8/25 Results follow up encounter. Please advise.

## 2025-03-23 ENCOUNTER — PATIENT MESSAGE (OUTPATIENT)
Dept: INTERNAL MEDICINE | Facility: CLINIC | Age: 60
End: 2025-03-23
Payer: COMMERCIAL

## 2025-03-25 ENCOUNTER — OFFICE VISIT (OUTPATIENT)
Dept: PODIATRY | Facility: CLINIC | Age: 60
End: 2025-03-25
Payer: COMMERCIAL

## 2025-03-25 VITALS
WEIGHT: 154.56 LBS | HEART RATE: 77 BPM | HEIGHT: 66 IN | DIASTOLIC BLOOD PRESSURE: 93 MMHG | BODY MASS INDEX: 24.84 KG/M2 | SYSTOLIC BLOOD PRESSURE: 151 MMHG

## 2025-03-25 DIAGNOSIS — M76.70 PERONEAL TENDONITIS, UNSPECIFIED LATERALITY: Primary | ICD-10-CM

## 2025-03-25 PROCEDURE — 3080F DIAST BP >= 90 MM HG: CPT | Mod: S$GLB,,, | Performed by: PODIATRIST

## 2025-03-25 PROCEDURE — 1159F MED LIST DOCD IN RCRD: CPT | Mod: S$GLB,,, | Performed by: PODIATRIST

## 2025-03-25 PROCEDURE — 99203 OFFICE O/P NEW LOW 30 MIN: CPT | Mod: S$GLB,,, | Performed by: PODIATRIST

## 2025-03-25 PROCEDURE — 3008F BODY MASS INDEX DOCD: CPT | Mod: S$GLB,,, | Performed by: PODIATRIST

## 2025-03-25 PROCEDURE — 3077F SYST BP >= 140 MM HG: CPT | Mod: S$GLB,,, | Performed by: PODIATRIST

## 2025-03-25 PROCEDURE — 1160F RVW MEDS BY RX/DR IN RCRD: CPT | Mod: S$GLB,,, | Performed by: PODIATRIST

## 2025-03-25 PROCEDURE — 99999 PR PBB SHADOW E&M-EST. PATIENT-LVL III: CPT | Mod: PBBFAC,,, | Performed by: PODIATRIST

## 2025-03-29 PROBLEM — M76.70 PERONEAL TENDONITIS, UNSPECIFIED LATERALITY: Status: ACTIVE | Noted: 2025-03-29

## 2025-03-29 NOTE — PROGRESS NOTES
Subjective:       Patient ID: Marquita Roe is a 59 y.o. female.    Chief Complaint: Ankle Pain  Patient presents for a new patient evaluation it has been over 3 years since I saw the patient last and she states she has been having pain in her right ankle she relates that she had injured her right ankle about a year and a half ago after she had slipped in her carport on a wet surface she states she did not seek any treatment at the time and she thought it would get completely better.  Patient states she does a lot of walking she is on her feet a lot she states she is really not having a significant amount of pain but she does have some discomfort on occasion and the outside of her right leg and her ankle area which does swell.  Patient relates a nerve like pain on the side of her right leg.    Past Medical History:   Diagnosis Date    Breast mass, right     Discharge from right nipple     Hyperlipidemia     Hypertension     Inversion of nipple     Right    Mitral valve regurgitation     Murmur      Past Surgical History:   Procedure Laterality Date    ANTERIOR CRUCIATE LIGAMENT REPAIR      BREAST BIOPSY Right 2018    core bx:Benign breast tissue with duct ectasia surrounded by chronic inflammation and fibrosis    BREAST BIOPSY Right 2018    exc bx:- Extensive mammary duct ectasia    BREAST LUMPECTOMY  2018    BREAST MASS EXCISION  2018    BREAST SURGERY  2018    Lumpectomy on right breast.  Non cancerous     SECTION  90    Two births by csection    COLONOSCOPY N/A 2023    Procedure: COLONOSCOPY;  Surgeon: Chemo Yip MD;  Location: [facility];  Service: General;  Laterality: N/A;    COSMETIC SURGERY  ??    Broken nose nasal surgery    EXCISIONAL BIOPSY Right 2018    Procedure: EXCISIONAL BIOPSY-breast  w/SEED LOCALIZATION;  Surgeon: Chayo Meraz MD;  Location: [address];  Service: General;  Laterality: Right;    FLEXIBLE SIGMOIDOSCOPY N/A  08/28/2017    FRACTURE SURGERY  June 2013    ACL surgery right knee    NOSE SURGERY       Family History   Problem Relation Name Age of Onset    Cancer Mother Renetta Calabrese 69        Mother passed from lung cancer due to a lifetime of smoking    Heart disease Father Narayan Pennington had a pace maker    Hypertension Father Narayan Calabrese         High blood pressure    Stroke Father Narayan Calabrese         Passes at age 56 due to stroke    Stroke Sister Piper Zhou         Stroke 4 years ago.  Survived but bed and wheel chair .     Social History     Socioeconomic History    Marital status:    Tobacco Use    Smoking status: Never    Smokeless tobacco: Never   Substance and Sexual Activity    Alcohol use: Yes     Alcohol/week: 1.0 standard drink of alcohol     Types: 1 Glasses of wine per week     Comment: Sometimes red wine once a week . 1 or 2 glasses    Drug use: Never    Sexual activity: Yes     Partners: Male     Birth control/protection: Partner-Vasectomy     Comment:  34 years together 39     Social Drivers of Health     Financial Resource Strain: Low Risk  (3/27/2024)    Overall Financial Resource Strain (CARDIA)     Difficulty of Paying Living Expenses: Not hard at all   Food Insecurity: No Food Insecurity (3/27/2024)    Hunger Vital Sign     Worried About Running Out of Food in the Last Year: Never true     Ran Out of Food in the Last Year: Never true   Transportation Needs: No Transportation Needs (3/27/2024)    PRAPARE - Transportation     Lack of Transportation (Medical): No     Lack of Transportation (Non-Medical): No   Physical Activity: Unknown (3/27/2024)    Exercise Vital Sign     Days of Exercise per Week: 5 days   Stress: Stress Concern Present (3/27/2024)    [employer] - Occupational Stress Questionnaire     Feeling of Stress : To some extent   Housing Stability: Low Risk  (3/27/2024)    Housing Stability Vital Sign     Unable to Pay for Housing in  "the Last Year: No     Number of Places Lived in the Last Year: 1     Unstable Housing in the Last Year: No       Current Medications[1]  Review of patient's allergies indicates:   Allergen Reactions    Pollen extracts     Cat/feline products Itching and Rash    Dog dander Itching and Rash    Dog hair standardized allergenic extract Itching and Rash       Review of Systems   Musculoskeletal:  Positive for arthralgias and joint swelling.   All other systems reviewed and are negative.      Objective:      Vitals:    03/25/25 1602   BP: (!) 151/93   Pulse: 77   Weight: 70.1 kg (154 lb 8.7 oz)   Height: 5' 6" (1.676 m)     Physical Exam  Vitals and nursing note reviewed.   Constitutional:       Appearance: Normal appearance.   Cardiovascular:      Pulses:           Dorsalis pedis pulses are 2+ on the right side and 2+ on the left side.        Posterior tibial pulses are 1+ on the right side and 1+ on the left side.   Pulmonary:      Effort: Pulmonary effort is normal.   Musculoskeletal:         General: Swelling, tenderness and signs of injury present.      Right foot: Deformity present.        Feet:    Feet:      Right foot:      Protective Sensation: 2 sites tested.  2 sites sensed.      Skin integrity: Erythema present.      Left foot:      Protective Sensation: 2 sites tested.  2 sites sensed.      Skin integrity: Skin integrity normal.   Skin:     Capillary Refill: Capillary refill takes 2 to 3 seconds.      Findings: Erythema present.   Neurological:      General: No focal deficit present.      Mental Status: She is alert.   Psychiatric:         Mood and Affect: Mood normal.         Behavior: Behavior normal.                          Assessment:       1. Peroneal tendonitis, unspecified laterality        Plan:       Patient presents for a new patient evaluation it has been over 3 years since I saw the patient last and she states she has been having pain in her right ankle she relates that she had injured her right " ankle about a year and a half ago after she had slipped in her carport on a wet surface she states she did not seek any treatment at the time and she thought it would get completely better.  Patient states she does a lot of walking she is on her feet a lot she states she is really not having a significant amount of pain but she does have some discomfort on occasion and the outside of her right leg and her ankle area which does swell.  Patient relates a nerve like pain on the side of her right leg.  Comprehensive new patient evaluation performed patient has no skin breaks no signs of infection she does have notable inflammation with swelling overlying the lateral malleolus and along the course of the peroneal tendons right in comparison to left.  Patient does have discomfort on occasion in the muscles involving the lateral compartment of the right lower extremity extending into the peroneal tendons right.  I did advised the patient clearly there was soft tissue injury with a slip she has some residual peroneal tendinitis with the associated swelling she is not having severe discomfort but she does get an electrical type discomfort from time to time.  Patient states that this has improved with her taking Co Q 10 and magnesium.  I did advised the patient in addition to those supplements a lack of potassium were decreased potassium can certainly cause some cramping I have recommended she try to eat a banana a day rather than take a potassium supplement.  I did advised the patient though even though she is having cramping she does have notable swelling consistent with inflammation overlying the peroneal tendons I have recommended the use of a compression sleeve which we fit the patient for and dispensed to her I have advised the patient this is something she needs to put on in the morning leave it on all day until she goes to bed in the evening this will give her more support it will address the swelling the inflammation  and should help the situation.  Patient did relate a family history of stroke I advised the patient unless she has any contraindication she should be taking a daily aspirin.  Patient has been wearing power step arch support she indicated she needed some new power steps with the additional blue arch padding which I applied for her the new power steps with more support should also help to take some stress off the peroneal tendons.  Patient does have a history of previous neuroma left foot she states this is been doing fine since she has been wearing the power steps.  I do recommend follow-up as needed I advised the patient if she is not seeing signs of improvement with the compression sleeve the arch support then we may want to try some other things but certainly if her condition worsens she is to contact us immediately.This note was created using [employer] voice recognition software that occasionally misinterpreted phrases or words.        [1]   Current Outpatient Medications   Medication Sig Dispense Refill    atenoloL-chlorthalidone (TENORETIC) 50-25 mg Tab Take 1 tablet by mouth every morning. 90 tablet 3    atorvastatin (LIPITOR) 20 MG tablet Take 1 tablet (20 mg total) by mouth once daily. 90 tablet 3     No current facility-administered medications for this visit.

## 2025-05-17 ENCOUNTER — PATIENT MESSAGE (OUTPATIENT)
Dept: ADMINISTRATIVE | Facility: HOSPITAL | Age: 60
End: 2025-05-17
Payer: COMMERCIAL

## 2025-05-19 ENCOUNTER — PATIENT OUTREACH (OUTPATIENT)
Dept: ADMINISTRATIVE | Facility: HOSPITAL | Age: 60
End: 2025-05-19
Payer: COMMERCIAL

## 2025-05-19 NOTE — PROGRESS NOTES
Population Health Chart Review & Patient Outreach Details      Additional [facility] Health Notes:               Updates Requested / Reviewed:      Updated Care Coordination Note         Health Maintenance Topics Overdue:      VBHM Score: 0     Uncontrolled BP  Patient is not due for any topics at this time.                       Health Maintenance Topic(s) Outreach Outcomes & Actions Taken:    Breast Cancer Screening - Outreach Outcomes & Actions Taken  : Portal message sent out regarding pt's  Mammogram that will be due soon

## 2025-06-12 ENCOUNTER — OFFICE VISIT (OUTPATIENT)
Dept: SURGERY | Facility: CLINIC | Age: 60
End: 2025-06-12
Payer: COMMERCIAL

## 2025-06-12 ENCOUNTER — HOSPITAL ENCOUNTER (OUTPATIENT)
Dept: RADIOLOGY | Facility: HOSPITAL | Age: 60
Discharge: HOME | End: 2025-06-12
Attending: NURSE PRACTITIONER
Payer: COMMERCIAL

## 2025-06-12 VITALS
HEART RATE: 76 BPM | SYSTOLIC BLOOD PRESSURE: 159 MMHG | BODY MASS INDEX: 25.55 KG/M2 | WEIGHT: 159 LBS | HEIGHT: 66 IN | DIASTOLIC BLOOD PRESSURE: 107 MMHG

## 2025-06-12 DIAGNOSIS — N60.41 DUCT ECTASIA OF BREAST, RIGHT: ICD-10-CM

## 2025-06-12 DIAGNOSIS — Z12.39 ENCOUNTER FOR BREAST CANCER SCREENING OTHER THAN MAMMOGRAM: ICD-10-CM

## 2025-06-12 DIAGNOSIS — N64.4 BREAST PAIN, LEFT: ICD-10-CM

## 2025-06-12 DIAGNOSIS — N64.59 INVERSION OF LEFT NIPPLE: Primary | ICD-10-CM

## 2025-06-12 DIAGNOSIS — N64.59 INVERSION OF LEFT NIPPLE: ICD-10-CM

## 2025-06-12 PROCEDURE — 3077F SYST BP >= 140 MM HG: CPT | Mod: CPTII,S$GLB,, | Performed by: PHYSICIAN ASSISTANT

## 2025-06-12 PROCEDURE — 76642 ULTRASOUND BREAST LIMITED: CPT | Mod: 26,,, | Performed by: RADIOLOGY

## 2025-06-12 PROCEDURE — 99214 OFFICE O/P EST MOD 30 MIN: CPT | Mod: S$GLB,,, | Performed by: PHYSICIAN ASSISTANT

## 2025-06-12 PROCEDURE — 77062 BREAST TOMOSYNTHESIS BI: CPT | Mod: TC

## 2025-06-12 PROCEDURE — 3008F BODY MASS INDEX DOCD: CPT | Mod: CPTII,S$GLB,, | Performed by: PHYSICIAN ASSISTANT

## 2025-06-12 PROCEDURE — 76642 ULTRASOUND BREAST LIMITED: CPT | Mod: TC,50

## 2025-06-12 PROCEDURE — 77066 DX MAMMO INCL CAD BI: CPT | Mod: 26,,, | Performed by: RADIOLOGY

## 2025-06-12 PROCEDURE — 99999 PR PBB SHADOW E&M-EST. PATIENT-LVL III: CPT | Mod: PBBFAC,,, | Performed by: PHYSICIAN ASSISTANT

## 2025-06-12 PROCEDURE — 3044F HG A1C LEVEL LT 7.0%: CPT | Mod: CPTII,S$GLB,, | Performed by: PHYSICIAN ASSISTANT

## 2025-06-12 PROCEDURE — 3080F DIAST BP >= 90 MM HG: CPT | Mod: CPTII,S$GLB,, | Performed by: PHYSICIAN ASSISTANT

## 2025-06-12 PROCEDURE — 77062 BREAST TOMOSYNTHESIS BI: CPT | Mod: 26,,, | Performed by: RADIOLOGY

## 2025-06-12 NOTE — PROGRESS NOTES
Breast Surgery  [facility]  Department of Surgery      REFERRING PROVIDER: Nuvia Escalante, NP-C  [address] [facility] [address] [place] [address]    Chief Complaint: New Patient, Left Nipple Inversion/Right Duct Ectasia, and Breast Pain      Subjective:      Patient ID: Marquita Roe is a 59 y.o. female who presents with palpable right breast mass associated with nipple discharge and nipple inversion. She states that the inversion and nipple discharge was first noticed in march.  Occurred once- serous in nature, non bloody, non purulent.     Mammogram and U/S on 18 did not reveal any abnormality. Follow-up MRI 18 showed near non mass enhancement in the central right breast extending towards the nipple with possible nipple retraction, measuring over 4 cm in long axis.  A breast biopsy has not yet been performed. Has had annual bilateral mammograms ([id number]) all BIRADS1-2.     Patient proceeded with a right excisional biopsy as well as an end duct excision with Dr. Meraz on 18.    She is here today to evaluate new left nipple inversion. Denies nipple discharge or new palpable masses. Notes occasional pain of both breasts, but left breast pain is new and more focal.     GYN History:  Age of menarche was 15. Age of menopause was perimenopausal.  Last menstrual period was 8 years ago. Patient denies hormonal therapy. On Lybrel for 5 years, stopped 4 years ago. Patient is . Age of first live birth was 25. Patient did breast feed.    PMH: HTN, HLD, Hemorrhoids   PSH: Nasal surgery, ACL repair       Smoker: Never smoker, Social drinker.     Family history  Mother - lung cancer, diagnosed 69,  (smoker)  No breast cancer, ovarian cancer, pancreatic cancer.     Review of patient's allergies indicates:  No Known Allergies      Current Outpatient Medications on File Prior to Visit   Medication Sig Dispense Refill    amoxicillin-clavulanate 875-125mg (AUGMENTIN) 875-125  mg per tablet Take 1 tablet by mouth every 12 (twelve) hours. 14 tablet 0    atenoloL-chlorthalidone (TENORETIC) 50-25 mg Tab Take 1 tablet by mouth every morning. 90 tablet 3    atorvastatin (LIPITOR) 20 MG tablet Take 1 tablet (20 mg total) by mouth once daily. 90 tablet 3    fluconazole (DIFLUCAN) 100 MG tablet Take 1 tablet (100 mg total) by mouth once daily. 30 tablet 0     No current facility-administered medications on file prior to visit.     Social History     Socioeconomic History    Marital status:    Tobacco Use    Smoking status: Never    Smokeless tobacco: Never   Substance and Sexual Activity    Alcohol use: Yes     Alcohol/week: 1.0 standard drink of alcohol     Types: 1 Glasses of wine per week     Comment: Sometimes red wine once a week . 1 or 2 glasses    Drug use: Never    Sexual activity: Yes     Partners: Male     Birth control/protection: Partner-Vasectomy     Comment:  34 years together 39     Social Drivers of Health     Financial Resource Strain: Low Risk  (3/27/2024)    Overall Financial Resource Strain (CARDIA)     Difficulty of Paying Living Expenses: Not hard at all   Food Insecurity: No Food Insecurity (3/27/2024)    Hunger Vital Sign     Worried About Running Out of Food in the Last Year: Never true     Ran Out of Food in the Last Year: Never true   Transportation Needs: No Transportation Needs (3/27/2024)    PRAPARE - Transportation     Lack of Transportation (Medical): No     Lack of Transportation (Non-Medical): No   Physical Activity: Unknown (3/27/2024)    Exercise Vital Sign     Days of Exercise per Week: 5 days   Stress: Stress Concern Present (3/27/2024)    [employer] - Occupational Stress Questionnaire     Feeling of Stress : To some extent   Housing Stability: Low Risk  (3/27/2024)    Housing Stability Vital Sign     Unable to Pay for Housing in the Last Year: No     Number of Places Lived in the Last Year: 1     Unstable Housing  "in the Last Year: No     Family History   Problem Relation Name Age of Onset    Cancer Mother Renetta Calabrese 69        Mother passed from lung cancer due to a lifetime of smoking    Heart disease Father Narayan Calabrese         Fatger had a pace maker    Hypertension Father Narayan Calabrese         High blood pressure    Stroke Father Narayan Calabrese         Passes at age 56 due to stroke    Stroke Sister Piper Zhou         Stroke 4 years ago.  Survived but bed and wheel chair .        Review of Systems   Constitutional:  Negative for appetite change, chills, fever and unexpected weight change.   HENT:  Negative for facial swelling, postnasal drip and sore throat.    Eyes:  Negative for redness and itching.   Respiratory:  Negative for chest tightness and shortness of breath.    Cardiovascular:  Negative for chest pain and palpitations.   Gastrointestinal:  Negative for blood in stool, diarrhea, nausea and vomiting.   Genitourinary:  Negative for difficulty urinating and dysuria.   Musculoskeletal:  Negative for arthralgias and joint swelling.   Skin:  Negative for rash and wound.   Neurological:  Negative for dizziness and syncope.   Hematological:  Negative for adenopathy.   Psychiatric/Behavioral:  Negative for agitation. The patient is not nervous/anxious.           Objective:   BP (!) 159/107   Pulse 76   Ht 5' 6" (1.676 m)   Wt 72.1 kg (159 lb)   BMI 25.66 kg/m²     Physical Exam   Vitals reviewed.  Constitutional: She is oriented to person, place, and time.   HENT:   Head: Normocephalic and atraumatic.   Nose: Nose normal.   Eyes: Pupils are equal, round, and reactive to light. Right eye exhibits no discharge. Left eye exhibits no discharge.   Pulmonary/Chest: Effort normal and breath sounds normal. No stridor. No respiratory distress. She exhibits no mass, no tenderness and no edema. Right breast exhibits inverted nipple. Right breast exhibits no mass, no nipple discharge, no skin change and no tenderness. Left " breast exhibits inverted nipple and tenderness. Left breast exhibits no mass, no nipple discharge and no skin change. No breast swelling or bleeding. Breasts are asymmetrical.       Abdominal: Normal appearance.   Genitourinary: No breast swelling or bleeding.   Neurological: She is alert and oriented to person, place, and time.   Skin: Skin is warm and dry.     Psychiatric: Her behavior is normal. Mood, judgment and thought content normal.          Radiology review: Images personally reviewed by me in the clinic.    6/12/25 Bilateral Diagnostic Mammo/US Bilateral:    Findings:  This procedure was performed using tomosynthesis. Computer-aided detection was utilized in the interpretation of this examination.        There are scattered areas of fibroglandular density.      Mammo Digital Diagnostic Bilat with Scott (XPD)  There is no evidence of suspicious masses, calcifications, or other abnormal findings.     US Breast Bilateral Limited  There is no evidence of suspicious masses or other abnormal findings in the areas of concern in the retroareolar right breast, 11 o'clock position 4 cm from the nipple in the left breast, retroareolar left breast, and lower outer region of the left breast.     Impression:  Bilateral  Mammo Digital Diagnostic Bilat with Scott (XPD)  There is no mammographic evidence of malignancy.     Bilateral  US Breast Bilateral Limited  There is no sonographic evidence of malignancy.     BI-RADS Category:   Overall: 1 - Negative        Recommendation:  Routine screening mammogram in 1 year is recommended.     Your estimated lifetime risk of breast cancer (to age 85) based on Tyrer-Cuzick risk assessment model is 5.13%.  According to the [employer] Society, patients with a lifetime breast cancer risk of 20% or higher might benefit from supplemental screening tests, such as screening breast MRI.      Assessment:       Encounter Diagnoses   Name Primary?    Inversion of left nipple Yes    Duct  ectasia of breast, right     Breast pain, left     Encounter for breast cancer screening other than mammogram        Plan:   1. Reviewed imagine without correlate to exam changes of pain and nipple discharge elicited with today's exam. I do recommend MRI for more definitive evaluation of these new changes. Patient is in agreement.   2. MRI will be coordinated. Next steps will be based on the results.   3. Patient verbalized understanding of plan. All questions asked and answered. Advised to call our office with any new or worsening sxs.       Total time spent with the patient: 35.  25 minutes of face to face consultation and 10 minutes of chart review and coordination of care.

## 2025-06-16 ENCOUNTER — HOSPITAL ENCOUNTER (OUTPATIENT)
Dept: RADIOLOGY | Facility: OTHER | Age: 60
Discharge: HOME | End: 2025-06-16
Attending: PHYSICIAN ASSISTANT
Payer: COMMERCIAL

## 2025-06-16 ENCOUNTER — PATIENT MESSAGE (OUTPATIENT)
Dept: SURGERY | Facility: CLINIC | Age: 60
End: 2025-06-16
Payer: COMMERCIAL

## 2025-06-16 DIAGNOSIS — N60.41 DUCT ECTASIA OF BREAST, RIGHT: ICD-10-CM

## 2025-06-16 DIAGNOSIS — N64.4 BREAST PAIN, LEFT: ICD-10-CM

## 2025-06-16 DIAGNOSIS — N64.59 INVERSION OF LEFT NIPPLE: ICD-10-CM

## 2025-06-16 DIAGNOSIS — Z12.39 ENCOUNTER FOR BREAST CANCER SCREENING OTHER THAN MAMMOGRAM: ICD-10-CM

## 2025-06-16 PROCEDURE — A9577 INJ MULTIHANCE: HCPCS | Performed by: PHYSICIAN ASSISTANT

## 2025-06-16 PROCEDURE — 77049 MRI BREAST C-+ W/CAD BI: CPT | Mod: TC

## 2025-06-16 PROCEDURE — 25500020 PHARM REV CODE 255: Performed by: PHYSICIAN ASSISTANT

## 2025-06-16 RX ADMIN — GADOBENATE DIMEGLUMINE 14 ML: 529 INJECTION, SOLUTION INTRAVENOUS at 10:06

## 2025-06-17 ENCOUNTER — PATIENT MESSAGE (OUTPATIENT)
Dept: SURGERY | Facility: CLINIC | Age: 60
End: 2025-06-17
Payer: COMMERCIAL

## 2025-06-24 ENCOUNTER — TELEPHONE (OUTPATIENT)
Dept: RADIOLOGY | Facility: HOSPITAL | Age: 60
End: 2025-06-24
Payer: COMMERCIAL

## 2025-06-24 NOTE — TELEPHONE ENCOUNTER
Spoke with patient. Reviewed MRI breast biopsy procedure and reviewed instructions for MRI breast biopsy. Patient expressed understanding and all questions were answered. Provided patient with my phone number to call for any further concerns or questions.   Patient scheduled MRI breast biopsy for 7/7/2025.

## 2025-07-01 ENCOUNTER — PATIENT MESSAGE (OUTPATIENT)
Dept: SURGERY | Facility: CLINIC | Age: 60
End: 2025-07-01
Payer: COMMERCIAL

## 2025-07-07 ENCOUNTER — HOSPITAL ENCOUNTER (OUTPATIENT)
Dept: RADIOLOGY | Facility: HOSPITAL | Age: 60
Discharge: HOME | End: 2025-07-07
Attending: PHYSICIAN ASSISTANT
Payer: COMMERCIAL

## 2025-07-07 ENCOUNTER — PATIENT MESSAGE (OUTPATIENT)
Dept: SURGERY | Facility: CLINIC | Age: 60
End: 2025-07-07
Payer: COMMERCIAL

## 2025-07-07 DIAGNOSIS — R92.8 ABNORMAL FINDING ON BREAST IMAGING: ICD-10-CM

## 2025-07-07 PROCEDURE — 27200939 MRI BREAST BIOPSY WITH IMAGING 1ST SITE

## 2025-07-07 PROCEDURE — A9577 INJ MULTIHANCE: HCPCS | Performed by: PHYSICIAN ASSISTANT

## 2025-07-07 PROCEDURE — 77065 DX MAMMO INCL CAD UNI: CPT | Mod: TC,LT

## 2025-07-07 PROCEDURE — 88341 IMHCHEM/IMCYTCHM EA ADD ANTB: CPT | Mod: TC,59 | Performed by: RADIOLOGY

## 2025-07-07 PROCEDURE — 19085 BX BREAST 1ST LESION MR IMAG: CPT | Mod: LT,,, | Performed by: RADIOLOGY

## 2025-07-07 PROCEDURE — 63600175 PHARM REV CODE 636 W HCPCS: Performed by: PHYSICIAN ASSISTANT

## 2025-07-07 PROCEDURE — 25500020 PHARM REV CODE 255: Performed by: PHYSICIAN ASSISTANT

## 2025-07-07 PROCEDURE — 77065 DX MAMMO INCL CAD UNI: CPT | Mod: 26,LT,, | Performed by: RADIOLOGY

## 2025-07-07 RX ORDER — LIDOCAINE HYDROCHLORIDE 10 MG/ML
3 INJECTION, SOLUTION EPIDURAL; INFILTRATION; INTRACAUDAL; PERINEURAL ONCE
Status: COMPLETED | OUTPATIENT
Start: 2025-07-07 | End: 2025-07-07

## 2025-07-07 RX ORDER — LIDOCAINE HYDROCHLORIDE AND EPINEPHRINE 10; 20 UG/ML; MG/ML
20 INJECTION, SOLUTION INFILTRATION; PERINEURAL ONCE
Status: COMPLETED | OUTPATIENT
Start: 2025-07-07 | End: 2025-07-07

## 2025-07-07 RX ADMIN — LIDOCAINE HYDROCHLORIDE,EPINEPHRINE BITARTRATE 20 ML: 20; .01 INJECTION, SOLUTION INFILTRATION; PERINEURAL at 09:07

## 2025-07-07 RX ADMIN — GADOBENATE DIMEGLUMINE 15 ML: 529 INJECTION, SOLUTION INTRAVENOUS at 09:07

## 2025-07-07 RX ADMIN — LIDOCAINE HYDROCHLORIDE 30 MG: 10 INJECTION, SOLUTION EPIDURAL; INFILTRATION; INTRACAUDAL; PERINEURAL at 09:07

## 2025-07-10 ENCOUNTER — PATIENT MESSAGE (OUTPATIENT)
Dept: SURGERY | Facility: CLINIC | Age: 60
End: 2025-07-10
Payer: COMMERCIAL

## 2025-07-10 LAB
ESTROGEN SERPL-MCNC: NORMAL PG/ML
INSULIN SERPL-ACNC: NORMAL U[IU]/ML
LAB AP GROSS DESCRIPTION: NORMAL
LAB AP PERFORMING LOCATION(S): NORMAL
LAB AP REPORT FOOTNOTES: NORMAL
LAB AP SURG PATH ADEQUACY: NORMAL
T3RU NFR SERPL: NORMAL %

## 2025-08-14 ENCOUNTER — OFFICE VISIT (OUTPATIENT)
Dept: SURGERY | Facility: CLINIC | Age: 60
End: 2025-08-14
Payer: COMMERCIAL

## 2025-08-14 VITALS
WEIGHT: 158.5 LBS | RESPIRATION RATE: 19 BRPM | OXYGEN SATURATION: 97 % | TEMPERATURE: 98 F | HEART RATE: 64 BPM | HEIGHT: 66 IN | SYSTOLIC BLOOD PRESSURE: 128 MMHG | DIASTOLIC BLOOD PRESSURE: 78 MMHG | BODY MASS INDEX: 25.47 KG/M2

## 2025-08-14 DIAGNOSIS — N60.42 DUCT ECTASIA OF BREAST, LEFT: ICD-10-CM

## 2025-08-14 DIAGNOSIS — N64.59 INVERSION OF LEFT NIPPLE: ICD-10-CM

## 2025-08-14 DIAGNOSIS — R92.8 ABNORMAL MRI, BREAST: Primary | ICD-10-CM

## 2025-08-14 DIAGNOSIS — Z01.818 PRE-OP TESTING: ICD-10-CM

## 2025-08-14 DIAGNOSIS — N64.4 BREAST PAIN, LEFT: ICD-10-CM

## 2025-08-14 PROCEDURE — 99214 OFFICE O/P EST MOD 30 MIN: CPT | Mod: S$GLB,,, | Performed by: SURGERY

## 2025-08-14 PROCEDURE — 3008F BODY MASS INDEX DOCD: CPT | Mod: CPTII,S$GLB,, | Performed by: SURGERY

## 2025-08-14 PROCEDURE — 3074F SYST BP LT 130 MM HG: CPT | Mod: CPTII,S$GLB,, | Performed by: SURGERY

## 2025-08-14 PROCEDURE — 3044F HG A1C LEVEL LT 7.0%: CPT | Mod: CPTII,S$GLB,, | Performed by: SURGERY

## 2025-08-14 PROCEDURE — 1159F MED LIST DOCD IN RCRD: CPT | Mod: CPTII,S$GLB,, | Performed by: SURGERY

## 2025-08-14 PROCEDURE — 3078F DIAST BP <80 MM HG: CPT | Mod: CPTII,S$GLB,, | Performed by: SURGERY

## 2025-08-14 PROCEDURE — 99999 PR PBB SHADOW E&M-EST. PATIENT-LVL III: CPT | Mod: PBBFAC,,, | Performed by: SURGERY

## 2025-08-14 RX ORDER — UBIDECARENONE 100 MG
CAPSULE ORAL
COMMUNITY

## 2025-08-14 RX ORDER — CHOLECALCIFEROL (VITAMIN D3) 50 MCG
CAPSULE ORAL
COMMUNITY

## 2025-08-14 RX ORDER — MAGNESIUM SULFATE HEPTAHYDRATE 40 MG/ML
INJECTION, SOLUTION INTRAVENOUS
COMMUNITY

## 2025-08-20 RX ORDER — SODIUM CHLORIDE 9 MG/ML
INJECTION, SOLUTION INTRAVENOUS CONTINUOUS
OUTPATIENT
Start: 2025-08-20

## 2025-09-02 ENCOUNTER — PATIENT MESSAGE (OUTPATIENT)
Dept: FAMILY MEDICINE | Facility: CLINIC | Age: 60
End: 2025-09-02
Payer: COMMERCIAL

## 2025-09-02 ENCOUNTER — OFFICE VISIT (OUTPATIENT)
Dept: URGENT CARE | Facility: CLINIC | Age: 60
End: 2025-09-02
Payer: COMMERCIAL

## 2025-09-02 VITALS
OXYGEN SATURATION: 98 % | HEART RATE: 86 BPM | TEMPERATURE: 98 F | DIASTOLIC BLOOD PRESSURE: 90 MMHG | WEIGHT: 154 LBS | SYSTOLIC BLOOD PRESSURE: 144 MMHG | HEIGHT: 66 IN | BODY MASS INDEX: 24.75 KG/M2 | RESPIRATION RATE: 18 BRPM

## 2025-09-02 DIAGNOSIS — T63.464A WASP STING, UNDETERMINED INTENT, INITIAL ENCOUNTER: Primary | ICD-10-CM

## 2025-09-02 DIAGNOSIS — L03.90 CELLULITIS, UNSPECIFIED CELLULITIS SITE: ICD-10-CM

## 2025-09-02 PROCEDURE — 99214 OFFICE O/P EST MOD 30 MIN: CPT | Mod: S$GLB,,, | Performed by: NURSE PRACTITIONER

## 2025-09-02 RX ORDER — DOXYCYCLINE 100 MG/1
100 CAPSULE ORAL EVERY 12 HOURS
Qty: 20 CAPSULE | Refills: 0 | Status: SHIPPED | OUTPATIENT
Start: 2025-09-02 | End: 2025-09-12

## 2025-09-02 RX ORDER — PREDNISONE 10 MG/1
10 TABLET ORAL DAILY
Qty: 5 TABLET | Refills: 0 | Status: SHIPPED | OUTPATIENT
Start: 2025-09-02 | End: 2025-09-07

## (undated) DEVICE — ELECTRODE REM PLYHSV RETURN 9

## (undated) DEVICE — GLOVE SURG ULTRA TOUCH 7.5

## (undated) DEVICE — SYR 60CC W/GAUGE

## (undated) DEVICE — SEE MEDLINE ITEM 146417

## (undated) DEVICE — SUT 2/0 30IN SILK BLK BRAI

## (undated) DEVICE — SOL WATER STRL IRR 1000ML

## (undated) DEVICE — ELECTRODE BLADE INSULATED 1 IN

## (undated) DEVICE — NDL 18GA X1 1/2 REG BEVEL

## (undated) DEVICE — SOL 0.9% NACL IRRI.IN STERIL

## (undated) DEVICE — TRAY MINOR GEN SURG

## (undated) DEVICE — GAUZE FLUFF XXLG 36X36 2 PLY

## (undated) DEVICE — KIT CANIST SUCTION 1200CC

## (undated) DEVICE — SUT MCRYL PLUS 4-0 PS2 27IN

## (undated) DEVICE — DRAPE STERI INSTRUMENT 1018

## (undated) DEVICE — SYS LABEL CORRECT MED

## (undated) DEVICE — COVER PROBE NL STRL 3.6X96IN

## (undated) DEVICE — ADHESIVE DERMABOND ADVANCED

## (undated) DEVICE — PACK UNIVERSAL SPLIT II

## (undated) DEVICE — SUT VICRYL 3-0 27 SH

## (undated) DEVICE — GAUZE SPONGE 4X4 12PLY

## (undated) DEVICE — SPONGE LAP 18X18 PREWASHED

## (undated) DEVICE — KIT DEFENDO VLV  AIR WATER SUC

## (undated) DEVICE — CONTAINER SPECIMEN STRL 4OZ